# Patient Record
Sex: FEMALE | Race: WHITE | NOT HISPANIC OR LATINO
[De-identification: names, ages, dates, MRNs, and addresses within clinical notes are randomized per-mention and may not be internally consistent; named-entity substitution may affect disease eponyms.]

---

## 2018-08-17 ENCOUNTER — TRANSCRIPTION ENCOUNTER (OUTPATIENT)
Age: 28
End: 2018-08-17

## 2019-12-31 ENCOUNTER — TRANSCRIPTION ENCOUNTER (OUTPATIENT)
Age: 29
End: 2019-12-31

## 2020-01-02 ENCOUNTER — TRANSCRIPTION ENCOUNTER (OUTPATIENT)
Age: 30
End: 2020-01-02

## 2021-04-22 ENCOUNTER — APPOINTMENT (OUTPATIENT)
Dept: ANTEPARTUM | Facility: CLINIC | Age: 31
End: 2021-04-22
Payer: COMMERCIAL

## 2021-04-22 ENCOUNTER — ASOB RESULT (OUTPATIENT)
Age: 31
End: 2021-04-22

## 2021-04-22 PROBLEM — Z00.00 ENCOUNTER FOR PREVENTIVE HEALTH EXAMINATION: Status: ACTIVE | Noted: 2021-04-22

## 2021-04-22 PROCEDURE — 76801 OB US < 14 WKS SINGLE FETUS: CPT

## 2021-04-22 PROCEDURE — 99072 ADDL SUPL MATRL&STAF TM PHE: CPT

## 2021-04-22 PROCEDURE — 76813 OB US NUCHAL MEAS 1 GEST: CPT | Mod: 59

## 2021-06-18 ENCOUNTER — APPOINTMENT (OUTPATIENT)
Dept: ANTEPARTUM | Facility: CLINIC | Age: 31
End: 2021-06-18
Payer: COMMERCIAL

## 2021-06-18 ENCOUNTER — ASOB RESULT (OUTPATIENT)
Age: 31
End: 2021-06-18

## 2021-06-18 PROCEDURE — 76811 OB US DETAILED SNGL FETUS: CPT

## 2021-06-18 PROCEDURE — 99072 ADDL SUPL MATRL&STAF TM PHE: CPT

## 2021-10-08 ENCOUNTER — ASOB RESULT (OUTPATIENT)
Age: 31
End: 2021-10-08

## 2021-10-08 ENCOUNTER — APPOINTMENT (OUTPATIENT)
Dept: ANTEPARTUM | Facility: CLINIC | Age: 31
End: 2021-10-08
Payer: COMMERCIAL

## 2021-10-08 PROCEDURE — 76816 OB US FOLLOW-UP PER FETUS: CPT

## 2021-10-21 ENCOUNTER — TRANSCRIPTION ENCOUNTER (OUTPATIENT)
Age: 31
End: 2021-10-21

## 2021-10-22 ENCOUNTER — INPATIENT (INPATIENT)
Facility: HOSPITAL | Age: 31
LOS: 2 days | Discharge: HOME | End: 2021-10-25
Attending: OBSTETRICS & GYNECOLOGY | Admitting: OBSTETRICS & GYNECOLOGY
Payer: COMMERCIAL

## 2021-10-22 VITALS — OXYGEN SATURATION: 98 %

## 2021-10-22 DIAGNOSIS — Z3A.00 WEEKS OF GESTATION OF PREGNANCY NOT SPECIFIED: ICD-10-CM

## 2021-10-22 DIAGNOSIS — O26.899 OTHER SPECIFIED PREGNANCY RELATED CONDITIONS, UNSPECIFIED TRIMESTER: ICD-10-CM

## 2021-10-22 DIAGNOSIS — Z34.80 ENCOUNTER FOR SUPERVISION OF OTHER NORMAL PREGNANCY, UNSPECIFIED TRIMESTER: ICD-10-CM

## 2021-10-22 LAB
ALBUMIN SERPL ELPH-MCNC: 3.3 G/DL — SIGNIFICANT CHANGE UP (ref 3.3–5)
ALBUMIN SERPL ELPH-MCNC: 3.6 G/DL — SIGNIFICANT CHANGE UP (ref 3.3–5)
ALP SERPL-CCNC: 272 U/L — HIGH (ref 40–120)
ALP SERPL-CCNC: 289 U/L — HIGH (ref 40–120)
ALT FLD-CCNC: 12 U/L — SIGNIFICANT CHANGE UP (ref 10–45)
ALT FLD-CCNC: 13 U/L — SIGNIFICANT CHANGE UP (ref 10–45)
ANION GAP SERPL CALC-SCNC: 14 MMOL/L — SIGNIFICANT CHANGE UP (ref 5–17)
ANION GAP SERPL CALC-SCNC: 16 MMOL/L — SIGNIFICANT CHANGE UP (ref 5–17)
APPEARANCE UR: ABNORMAL
APTT BLD: 30.7 SEC — SIGNIFICANT CHANGE UP (ref 27.5–35.5)
APTT BLD: 32.2 SEC — SIGNIFICANT CHANGE UP (ref 27.5–35.5)
AST SERPL-CCNC: 13 U/L — SIGNIFICANT CHANGE UP (ref 10–40)
AST SERPL-CCNC: 13 U/L — SIGNIFICANT CHANGE UP (ref 10–40)
BACTERIA # UR AUTO: ABNORMAL
BASOPHILS # BLD AUTO: 0.03 K/UL — SIGNIFICANT CHANGE UP (ref 0–0.2)
BASOPHILS # BLD AUTO: 0.05 K/UL — SIGNIFICANT CHANGE UP (ref 0–0.2)
BASOPHILS NFR BLD AUTO: 0.4 % — SIGNIFICANT CHANGE UP (ref 0–2)
BASOPHILS NFR BLD AUTO: 0.7 % — SIGNIFICANT CHANGE UP (ref 0–2)
BILIRUB SERPL-MCNC: 0.2 MG/DL — SIGNIFICANT CHANGE UP (ref 0.2–1.2)
BILIRUB SERPL-MCNC: 0.2 MG/DL — SIGNIFICANT CHANGE UP (ref 0.2–1.2)
BILIRUB UR-MCNC: NEGATIVE — SIGNIFICANT CHANGE UP
BLD GP AB SCN SERPL QL: NEGATIVE — SIGNIFICANT CHANGE UP
BUN SERPL-MCNC: 5 MG/DL — LOW (ref 7–23)
BUN SERPL-MCNC: 6 MG/DL — LOW (ref 7–23)
CALCIUM SERPL-MCNC: 7.7 MG/DL — LOW (ref 8.4–10.5)
CALCIUM SERPL-MCNC: 9.1 MG/DL — SIGNIFICANT CHANGE UP (ref 8.4–10.5)
CHLORIDE SERPL-SCNC: 102 MMOL/L — SIGNIFICANT CHANGE UP (ref 96–108)
CHLORIDE SERPL-SCNC: 99 MMOL/L — SIGNIFICANT CHANGE UP (ref 96–108)
CO2 SERPL-SCNC: 17 MMOL/L — LOW (ref 22–31)
CO2 SERPL-SCNC: 19 MMOL/L — LOW (ref 22–31)
COLOR SPEC: YELLOW — SIGNIFICANT CHANGE UP
COVID-19 SPIKE DOMAIN AB INTERP: NEGATIVE — SIGNIFICANT CHANGE UP
COVID-19 SPIKE DOMAIN ANTIBODY RESULT: 0.4 U/ML — SIGNIFICANT CHANGE UP
CREAT ?TM UR-MCNC: 101 MG/DL — SIGNIFICANT CHANGE UP
CREAT SERPL-MCNC: 0.6 MG/DL — SIGNIFICANT CHANGE UP (ref 0.5–1.3)
CREAT SERPL-MCNC: 0.66 MG/DL — SIGNIFICANT CHANGE UP (ref 0.5–1.3)
DIFF PNL FLD: NEGATIVE — SIGNIFICANT CHANGE UP
EOSINOPHIL # BLD AUTO: 0.03 K/UL — SIGNIFICANT CHANGE UP (ref 0–0.5)
EOSINOPHIL # BLD AUTO: 0.13 K/UL — SIGNIFICANT CHANGE UP (ref 0–0.5)
EOSINOPHIL NFR BLD AUTO: 0.4 % — SIGNIFICANT CHANGE UP (ref 0–6)
EOSINOPHIL NFR BLD AUTO: 1.8 % — SIGNIFICANT CHANGE UP (ref 0–6)
EPI CELLS # UR: 4 /HPF — SIGNIFICANT CHANGE UP
FIBRINOGEN PPP-MCNC: 830 MG/DL — HIGH (ref 290–520)
FIBRINOGEN PPP-MCNC: 846 MG/DL — HIGH (ref 290–520)
GLUCOSE SERPL-MCNC: 66 MG/DL — LOW (ref 70–99)
GLUCOSE SERPL-MCNC: 84 MG/DL — SIGNIFICANT CHANGE UP (ref 70–99)
GLUCOSE UR QL: NEGATIVE — SIGNIFICANT CHANGE UP
HCT VFR BLD CALC: 34 % — LOW (ref 34.5–45)
HCT VFR BLD CALC: 36.2 % — SIGNIFICANT CHANGE UP (ref 34.5–45)
HGB BLD-MCNC: 11.2 G/DL — LOW (ref 11.5–15.5)
HGB BLD-MCNC: 11.7 G/DL — SIGNIFICANT CHANGE UP (ref 11.5–15.5)
HYALINE CASTS # UR AUTO: 4 /LPF — HIGH (ref 0–2)
IMM GRANULOCYTES NFR BLD AUTO: 0.7 % — SIGNIFICANT CHANGE UP (ref 0–1.5)
IMM GRANULOCYTES NFR BLD AUTO: 0.9 % — SIGNIFICANT CHANGE UP (ref 0–1.5)
INR BLD: 0.9 RATIO — SIGNIFICANT CHANGE UP (ref 0.88–1.16)
INR BLD: 0.93 RATIO — SIGNIFICANT CHANGE UP (ref 0.88–1.16)
KETONES UR-MCNC: NEGATIVE — SIGNIFICANT CHANGE UP
LDH SERPL L TO P-CCNC: 203 U/L — SIGNIFICANT CHANGE UP (ref 50–242)
LDH SERPL L TO P-CCNC: 227 U/L — SIGNIFICANT CHANGE UP (ref 50–242)
LEUKOCYTE ESTERASE UR-ACNC: ABNORMAL
LYMPHOCYTES # BLD AUTO: 1.79 K/UL — SIGNIFICANT CHANGE UP (ref 1–3.3)
LYMPHOCYTES # BLD AUTO: 2.17 K/UL — SIGNIFICANT CHANGE UP (ref 1–3.3)
LYMPHOCYTES # BLD AUTO: 23.4 % — SIGNIFICANT CHANGE UP (ref 13–44)
LYMPHOCYTES # BLD AUTO: 30.8 % — SIGNIFICANT CHANGE UP (ref 13–44)
MAGNESIUM SERPL-MCNC: 5.1 MG/DL — HIGH (ref 1.6–2.6)
MAGNESIUM SERPL-MCNC: 5.2 MG/DL — HIGH (ref 1.6–2.6)
MCHC RBC-ENTMCNC: 26.4 PG — LOW (ref 27–34)
MCHC RBC-ENTMCNC: 26.6 PG — LOW (ref 27–34)
MCHC RBC-ENTMCNC: 32.3 GM/DL — SIGNIFICANT CHANGE UP (ref 32–36)
MCHC RBC-ENTMCNC: 32.9 GM/DL — SIGNIFICANT CHANGE UP (ref 32–36)
MCV RBC AUTO: 80 FL — SIGNIFICANT CHANGE UP (ref 80–100)
MCV RBC AUTO: 82.3 FL — SIGNIFICANT CHANGE UP (ref 80–100)
MONOCYTES # BLD AUTO: 0.49 K/UL — SIGNIFICANT CHANGE UP (ref 0–0.9)
MONOCYTES # BLD AUTO: 0.62 K/UL — SIGNIFICANT CHANGE UP (ref 0–0.9)
MONOCYTES NFR BLD AUTO: 6.4 % — SIGNIFICANT CHANGE UP (ref 2–14)
MONOCYTES NFR BLD AUTO: 8.8 % — SIGNIFICANT CHANGE UP (ref 2–14)
NEUTROPHILS # BLD AUTO: 4.02 K/UL — SIGNIFICANT CHANGE UP (ref 1.8–7.4)
NEUTROPHILS # BLD AUTO: 5.24 K/UL — SIGNIFICANT CHANGE UP (ref 1.8–7.4)
NEUTROPHILS NFR BLD AUTO: 57.2 % — SIGNIFICANT CHANGE UP (ref 43–77)
NEUTROPHILS NFR BLD AUTO: 68.5 % — SIGNIFICANT CHANGE UP (ref 43–77)
NITRITE UR-MCNC: NEGATIVE — SIGNIFICANT CHANGE UP
NRBC # BLD: 0 /100 WBCS — SIGNIFICANT CHANGE UP (ref 0–0)
NRBC # BLD: 0 /100 WBCS — SIGNIFICANT CHANGE UP (ref 0–0)
PH UR: 7 — SIGNIFICANT CHANGE UP (ref 5–8)
PLATELET # BLD AUTO: 172 K/UL — SIGNIFICANT CHANGE UP (ref 150–400)
PLATELET # BLD AUTO: 193 K/UL — SIGNIFICANT CHANGE UP (ref 150–400)
POTASSIUM SERPL-MCNC: 3.6 MMOL/L — SIGNIFICANT CHANGE UP (ref 3.5–5.3)
POTASSIUM SERPL-MCNC: 4 MMOL/L — SIGNIFICANT CHANGE UP (ref 3.5–5.3)
POTASSIUM SERPL-SCNC: 3.6 MMOL/L — SIGNIFICANT CHANGE UP (ref 3.5–5.3)
POTASSIUM SERPL-SCNC: 4 MMOL/L — SIGNIFICANT CHANGE UP (ref 3.5–5.3)
PROT ?TM UR-MCNC: 39 MG/DL — HIGH (ref 0–12)
PROT SERPL-MCNC: 6 G/DL — SIGNIFICANT CHANGE UP (ref 6–8.3)
PROT SERPL-MCNC: 6.5 G/DL — SIGNIFICANT CHANGE UP (ref 6–8.3)
PROT UR-MCNC: ABNORMAL
PROT/CREAT UR-RTO: 0.4 RATIO — HIGH (ref 0–0.2)
PROTHROM AB SERPL-ACNC: 10.8 SEC — SIGNIFICANT CHANGE UP (ref 10.6–13.6)
PROTHROM AB SERPL-ACNC: 11.2 SEC — SIGNIFICANT CHANGE UP (ref 10.6–13.6)
RBC # BLD: 4.25 M/UL — SIGNIFICANT CHANGE UP (ref 3.8–5.2)
RBC # BLD: 4.4 M/UL — SIGNIFICANT CHANGE UP (ref 3.8–5.2)
RBC # FLD: 12.3 % — SIGNIFICANT CHANGE UP (ref 10.3–14.5)
RBC # FLD: 12.3 % — SIGNIFICANT CHANGE UP (ref 10.3–14.5)
RBC CASTS # UR COMP ASSIST: SIGNIFICANT CHANGE UP /HPF (ref 0–4)
RH IG SCN BLD-IMP: POSITIVE — SIGNIFICANT CHANGE UP
SARS-COV-2 IGG+IGM SERPL QL IA: 0.4 U/ML — SIGNIFICANT CHANGE UP
SARS-COV-2 IGG+IGM SERPL QL IA: NEGATIVE — SIGNIFICANT CHANGE UP
SARS-COV-2 RNA SPEC QL NAA+PROBE: SIGNIFICANT CHANGE UP
SODIUM SERPL-SCNC: 132 MMOL/L — LOW (ref 135–145)
SODIUM SERPL-SCNC: 135 MMOL/L — SIGNIFICANT CHANGE UP (ref 135–145)
SP GR SPEC: 1.02 — SIGNIFICANT CHANGE UP (ref 1.01–1.02)
T PALLIDUM AB TITR SER: NEGATIVE — SIGNIFICANT CHANGE UP
URATE SERPL-MCNC: 4.9 MG/DL — SIGNIFICANT CHANGE UP (ref 2.5–7)
URATE SERPL-MCNC: 5 MG/DL — SIGNIFICANT CHANGE UP (ref 2.5–7)
UROBILINOGEN FLD QL: NEGATIVE — SIGNIFICANT CHANGE UP
WBC # BLD: 7.04 K/UL — SIGNIFICANT CHANGE UP (ref 3.8–10.5)
WBC # BLD: 7.65 K/UL — SIGNIFICANT CHANGE UP (ref 3.8–10.5)
WBC # FLD AUTO: 7.04 K/UL — SIGNIFICANT CHANGE UP (ref 3.8–10.5)
WBC # FLD AUTO: 7.65 K/UL — SIGNIFICANT CHANGE UP (ref 3.8–10.5)
WBC UR QL: 29 /HPF — HIGH (ref 0–5)

## 2021-10-22 PROCEDURE — 88307 TISSUE EXAM BY PATHOLOGIST: CPT | Mod: 26

## 2021-10-22 RX ORDER — ACETAMINOPHEN 500 MG
975 TABLET ORAL
Refills: 0 | Status: DISCONTINUED | OUTPATIENT
Start: 2021-10-22 | End: 2021-10-25

## 2021-10-22 RX ORDER — DIPHENHYDRAMINE HCL 50 MG
25 CAPSULE ORAL ONCE
Refills: 0 | Status: COMPLETED | OUTPATIENT
Start: 2021-10-22 | End: 2021-10-22

## 2021-10-22 RX ORDER — OXYTOCIN 10 UNIT/ML
333.33 VIAL (ML) INJECTION
Qty: 20 | Refills: 0 | Status: DISCONTINUED | OUTPATIENT
Start: 2021-10-22 | End: 2021-10-25

## 2021-10-22 RX ORDER — SIMETHICONE 80 MG/1
80 TABLET, CHEWABLE ORAL EVERY 4 HOURS
Refills: 0 | Status: DISCONTINUED | OUTPATIENT
Start: 2021-10-22 | End: 2021-10-25

## 2021-10-22 RX ORDER — VANCOMYCIN HCL 1 G
VIAL (EA) INTRAVENOUS
Refills: 0 | Status: DISCONTINUED | OUTPATIENT
Start: 2021-10-22 | End: 2021-10-23

## 2021-10-22 RX ORDER — HEPARIN SODIUM 5000 [USP'U]/ML
5000 INJECTION INTRAVENOUS; SUBCUTANEOUS EVERY 12 HOURS
Refills: 0 | Status: DISCONTINUED | OUTPATIENT
Start: 2021-10-22 | End: 2021-10-25

## 2021-10-22 RX ORDER — IBUPROFEN 200 MG
600 TABLET ORAL EVERY 6 HOURS
Refills: 0 | Status: COMPLETED | OUTPATIENT
Start: 2021-10-22 | End: 2022-09-20

## 2021-10-22 RX ORDER — OXYCODONE HYDROCHLORIDE 5 MG/1
10 TABLET ORAL
Refills: 0 | Status: DISCONTINUED | OUTPATIENT
Start: 2021-10-22 | End: 2021-10-23

## 2021-10-22 RX ORDER — NIFEDIPINE 30 MG
20 TABLET, EXTENDED RELEASE 24 HR ORAL ONCE
Refills: 0 | Status: COMPLETED | OUTPATIENT
Start: 2021-10-22 | End: 2021-10-22

## 2021-10-22 RX ORDER — MAGNESIUM SULFATE 500 MG/ML
4 VIAL (ML) INJECTION ONCE
Refills: 0 | Status: COMPLETED | OUTPATIENT
Start: 2021-10-22 | End: 2021-10-22

## 2021-10-22 RX ORDER — INFLUENZA VIRUS VACCINE 15; 15; 15; 15 UG/.5ML; UG/.5ML; UG/.5ML; UG/.5ML
0.5 SUSPENSION INTRAMUSCULAR ONCE
Refills: 0 | Status: DISCONTINUED | OUTPATIENT
Start: 2021-10-22 | End: 2021-10-25

## 2021-10-22 RX ORDER — NALOXONE HYDROCHLORIDE 4 MG/.1ML
0.1 SPRAY NASAL
Refills: 0 | Status: DISCONTINUED | OUTPATIENT
Start: 2021-10-22 | End: 2021-10-23

## 2021-10-22 RX ORDER — DIPHENHYDRAMINE HCL 50 MG
25 CAPSULE ORAL EVERY 6 HOURS
Refills: 0 | Status: DISCONTINUED | OUTPATIENT
Start: 2021-10-22 | End: 2021-10-25

## 2021-10-22 RX ORDER — TETANUS TOXOID, REDUCED DIPHTHERIA TOXOID AND ACELLULAR PERTUSSIS VACCINE, ADSORBED 5; 2.5; 8; 8; 2.5 [IU]/.5ML; [IU]/.5ML; UG/.5ML; UG/.5ML; UG/.5ML
0.5 SUSPENSION INTRAMUSCULAR ONCE
Refills: 0 | Status: DISCONTINUED | OUTPATIENT
Start: 2021-10-22 | End: 2021-10-25

## 2021-10-22 RX ORDER — SODIUM CHLORIDE 9 MG/ML
1000 INJECTION, SOLUTION INTRAVENOUS
Refills: 0 | Status: DISCONTINUED | OUTPATIENT
Start: 2021-10-22 | End: 2021-10-25

## 2021-10-22 RX ORDER — DEXAMETHASONE 0.5 MG/5ML
4 ELIXIR ORAL EVERY 6 HOURS
Refills: 0 | Status: DISCONTINUED | OUTPATIENT
Start: 2021-10-22 | End: 2021-10-23

## 2021-10-22 RX ORDER — MAGNESIUM SULFATE 500 MG/ML
2 VIAL (ML) INJECTION
Qty: 40 | Refills: 0 | Status: DISCONTINUED | OUTPATIENT
Start: 2021-10-22 | End: 2021-10-23

## 2021-10-22 RX ORDER — SODIUM CHLORIDE 9 MG/ML
1000 INJECTION, SOLUTION INTRAVENOUS
Refills: 0 | Status: DISCONTINUED | OUTPATIENT
Start: 2021-10-22 | End: 2021-10-23

## 2021-10-22 RX ORDER — MAGNESIUM SULFATE 500 MG/ML
4 VIAL (ML) INJECTION ONCE
Refills: 0 | Status: DISCONTINUED | OUTPATIENT
Start: 2021-10-22 | End: 2021-10-22

## 2021-10-22 RX ORDER — VANCOMYCIN HCL 1 G
1000 VIAL (EA) INTRAVENOUS ONCE
Refills: 0 | Status: COMPLETED | OUTPATIENT
Start: 2021-10-22 | End: 2021-10-22

## 2021-10-22 RX ORDER — LANOLIN
1 OINTMENT (GRAM) TOPICAL EVERY 6 HOURS
Refills: 0 | Status: DISCONTINUED | OUTPATIENT
Start: 2021-10-22 | End: 2021-10-25

## 2021-10-22 RX ORDER — ONDANSETRON 8 MG/1
4 TABLET, FILM COATED ORAL EVERY 6 HOURS
Refills: 0 | Status: DISCONTINUED | OUTPATIENT
Start: 2021-10-22 | End: 2021-10-23

## 2021-10-22 RX ORDER — OXYCODONE HYDROCHLORIDE 5 MG/1
5 TABLET ORAL
Refills: 0 | Status: DISCONTINUED | OUTPATIENT
Start: 2021-10-22 | End: 2021-10-25

## 2021-10-22 RX ORDER — MAGNESIUM HYDROXIDE 400 MG/1
30 TABLET, CHEWABLE ORAL
Refills: 0 | Status: DISCONTINUED | OUTPATIENT
Start: 2021-10-22 | End: 2021-10-25

## 2021-10-22 RX ORDER — LABETALOL HCL 100 MG
200 TABLET ORAL
Refills: 0 | Status: DISCONTINUED | OUTPATIENT
Start: 2021-10-22 | End: 2021-10-23

## 2021-10-22 RX ORDER — CITRIC ACID/SODIUM CITRATE 300-500 MG
15 SOLUTION, ORAL ORAL EVERY 6 HOURS
Refills: 0 | Status: DISCONTINUED | OUTPATIENT
Start: 2021-10-22 | End: 2021-10-23

## 2021-10-22 RX ORDER — NIFEDIPINE 30 MG
10 TABLET, EXTENDED RELEASE 24 HR ORAL ONCE
Refills: 0 | Status: COMPLETED | OUTPATIENT
Start: 2021-10-22 | End: 2021-10-22

## 2021-10-22 RX ORDER — METOCLOPRAMIDE HCL 10 MG
10 TABLET ORAL ONCE
Refills: 0 | Status: COMPLETED | OUTPATIENT
Start: 2021-10-22 | End: 2021-10-22

## 2021-10-22 RX ORDER — VANCOMYCIN HCL 1 G
1000 VIAL (EA) INTRAVENOUS EVERY 12 HOURS
Refills: 0 | Status: DISCONTINUED | OUTPATIENT
Start: 2021-10-23 | End: 2021-10-23

## 2021-10-22 RX ORDER — MAGNESIUM SULFATE 500 MG/ML
2 VIAL (ML) INJECTION
Qty: 40 | Refills: 0 | Status: DISCONTINUED | OUTPATIENT
Start: 2021-10-22 | End: 2021-10-22

## 2021-10-22 RX ORDER — MORPHINE SULFATE 50 MG/1
0.1 CAPSULE, EXTENDED RELEASE ORAL ONCE
Refills: 0 | Status: DISCONTINUED | OUTPATIENT
Start: 2021-10-22 | End: 2021-10-23

## 2021-10-22 RX ORDER — DIPHENHYDRAMINE HCL 50 MG
25 CAPSULE ORAL ONCE
Refills: 0 | Status: DISCONTINUED | OUTPATIENT
Start: 2021-10-22 | End: 2021-10-22

## 2021-10-22 RX ORDER — OXYCODONE HYDROCHLORIDE 5 MG/1
5 TABLET ORAL
Refills: 0 | Status: DISCONTINUED | OUTPATIENT
Start: 2021-10-22 | End: 2021-10-23

## 2021-10-22 RX ORDER — OXYCODONE HYDROCHLORIDE 5 MG/1
5 TABLET ORAL ONCE
Refills: 0 | Status: DISCONTINUED | OUTPATIENT
Start: 2021-10-22 | End: 2021-10-25

## 2021-10-22 RX ADMIN — Medication 50 GM/HR: at 12:26

## 2021-10-22 RX ADMIN — Medication 200 MILLIGRAM(S): at 18:26

## 2021-10-22 RX ADMIN — Medication 10 MILLIGRAM(S): at 11:38

## 2021-10-22 RX ADMIN — Medication 10 MILLIGRAM(S): at 13:46

## 2021-10-22 RX ADMIN — Medication 250 MILLIGRAM(S): at 13:25

## 2021-10-22 RX ADMIN — Medication 25 MILLIGRAM(S): at 15:45

## 2021-10-22 RX ADMIN — Medication 300 GRAM(S): at 11:49

## 2021-10-22 RX ADMIN — Medication 20 MILLIGRAM(S): at 15:36

## 2021-10-22 NOTE — OB PROVIDER H&P - ASSESSMENT
-2
30 y/o  @ 38w1d presents with elevated BP concerning for sPEC    - Admit to L&D   - Concerning for sPEC: severe BPs 171/105 repeat 162/117, 's. s/p Nifedipine IR 10 mg x 1 dose, repeated /93. Start Magnesium for seizure ppx. sent HELLP labs. Reglan/Benadryl prn for HA  - CLD/IVF  - Admission labs  - [facility]/[place]  - Continue monitor vitals  - IOL for sPEC    Plan of care d/w Dr. Noreen Stinson PA-C

## 2021-10-22 NOTE — OB RN TRIAGE NOTE - NS_LABORDETAILS_OBGYN_ALL_OB
Dx: Acute bilateral low back pain without sciatica (M54.5), Degenerative disc disease, lumbar (M51.36), Spinal stenosis of lumbar region without neurogenic claudication (O16.014)  Authorized # of Visits: 2/8  Next MD visit: none scheduled  Fall Risk: stand
None

## 2021-10-22 NOTE — OB RN INTRAOPERATIVE NOTE - NSSELHIDDEN_OBGYN_ALL_OB_FT
[NS_DeliveryAttending1_OBGYN_ALL_OB_FT:[id number]==] [NS_DeliveryAttending1_OBGYN_ALL_OB_FT:[id number]==],[NS_DeliveryRN_OBGYN_ALL_OB_FT:[id number]=]

## 2021-10-22 NOTE — OB PROVIDER LABOR PROGRESS NOTE - NS_OBIHIFHRDETAILS_OBGYN_ALL_OB_FT
125, reactive cat 1 tracing
125, reactive cat 1 tracing
Cat I: 130/ mod variability/ + accels/ - decels

## 2021-10-22 NOTE — OB PROVIDER DELIVERY SUMMARY - NSPROVIDERDELIVERYNOTE_OBGYN_ALL_OB_FT
primary LTCS, uncomplicated for worsening severe preeclampsia  viable female infant, vertex presentation, Apgars 9/9 cord gasses sent  Grossly normal fallopian tubes, uterus, and ovaries    Laura placed over the hysterotomy     EBL: 800  IVF: 2000  UOP:800    Danielle PGY2  w/ Dr. Sorto

## 2021-10-22 NOTE — OB PROVIDER H&P - HISTORY OF PRESENT ILLNESS
30 y/o  @ 38w1d EDC 21 was sent from office for elevated /100 today. c/o frontal HA for 2 days, not relieved with Tylenol. c/o n/v intermittently in 3rd trimester. reports FM, denies ctx, LOF or VB. denies vision change or RUQ pain.     PGYNHx: Hx of +HPV s/p colposcopy  PMHx: none  PSHx: none  ALL: Penicillin (childhood, hives)  SH: denies t/e/d, denies hx of anxiety or depression

## 2021-10-22 NOTE — OB PROVIDER H&P - NSHPPHYSICALEXAM_GEN_ALL_CORE
Gen: NAD, A&O x 4  Pulm: non-labor breathing   Heart: tachycardic   Abd: soft, Gravid, NT  Ext: skin warm to touch  Pelvis: 1/50/-3    BSUS: Vertex

## 2021-10-22 NOTE — OB PROVIDER LABOR PROGRESS NOTE - ASSESSMENT
32 yo  @38+1 sPEC s/p procardia 10 and 20 push.    - SROM @5:10pm  - T Cat I  - Expect JORDY Chairez, PGY1
Given worsening headache refractory to tx  will need to proceed to  delivery for severe PEC with severe features  will consider head imaging if headaches do not improve  L+D and anesthesia team notified  Above d/w pt and her  and all questions were answered  Will proceed to  delivery     UMBERTO Grewal
Reviewed pt's bps;    Few severe range bps noted.  Additional 20mg PO nifefipine was given to pt and bps improved.  Pt also started on labetalol 200mg BID as maintenance  Will continue Mag sulfate for seizure prophylaxis.  Continue oral cytotec  Expectant mgmt    UMBERTO Grewal

## 2021-10-22 NOTE — OB NEONATOLOGY/PEDIATRICIAN DELIVERY SUMMARY - NSPEDSNEONOTESA_OBGYN_ALL_OB_FT
Baby girl born at 38.1 wks via CS 2/2 worsening maternal pre-eclampsia to a 30y/o  O+blood type mother. Maternal history of migraines and HPV (resolved) Prenatal history of pre-eclampsia on mag.  PNL nr/immune/-, GBS + 10/07 ( 1x rasmussen vanc). SROM at 17:13 with clear fluids. Baby emerged vigorous, crying, was w/d/s/s with APGARS of 9/9. Mom would like to breast feed, consents Hep B and EOS 0.04    BW: 2803 g  : 10/22  TOB: 20:24  ADOD: 10/24

## 2021-10-22 NOTE — OB PROVIDER LABOR PROGRESS NOTE - NS_SUBJECTIVE/OBJECTIVE_OBGYN_ALL_OB_FT
Pt c/o worsening headache, now rating it 10/10 with nausea
Pt c/o headache.  She has h/o migraines and feels it is like a typical migraine  Denies any visual changes or epigastric discomfort
Called by RN because patient SROMed @5:10 pm. Patient is 1.5/0/-3

## 2021-10-22 NOTE — OB PROVIDER H&P - ATTENDING COMMENTS
I personally saw and evaluated pt and agree with IDALIA Stinson's assessment and plan.  Pt was sent to NS L+D from office with elevated bp of 150/103.  She reports a 3 day h/o headaches but she has h/o migraines and felt it was from her migraines  She denies any visual changes or epigastric pain  On arrival to L+D  pt had 2 severe range bps within 15 min span.  She was given dose of PO nifedipine 10mg and bp responded well.  Pt was also started on Mag sulfate for seizure prophylaxis  Discussed with pt and her  regarding IOL at term for sPEC.   All questions answered.  Will start IOL with oral cytotec    UMBERTO Grewal

## 2021-10-22 NOTE — OB RN DELIVERY SUMMARY - NS_SEPSISRSKCALC_OBGYN_ALL_OB_FT
EOS calculated successfully. EOS Risk Factor: 0.5/1000 live births ([facility] national incidence); GA=38w1d; Temp=98.24; ROM=3.183; GBS='Positive'; Antibiotics='Broad spectrum antibiotics > 4 hrs prior to birth'

## 2021-10-23 LAB
MAGNESIUM SERPL-MCNC: 5.4 MG/DL — HIGH (ref 1.6–2.6)
MAGNESIUM SERPL-MCNC: 5.9 MG/DL — HIGH (ref 1.6–2.6)
MAGNESIUM SERPL-MCNC: 6.4 MG/DL — HIGH (ref 1.6–2.6)
MAGNESIUM SERPL-MCNC: 6.4 MG/DL — HIGH (ref 1.6–2.6)

## 2021-10-23 RX ORDER — KETOROLAC TROMETHAMINE 30 MG/ML
30 SYRINGE (ML) INJECTION EVERY 6 HOURS
Refills: 0 | Status: DISCONTINUED | OUTPATIENT
Start: 2021-10-23 | End: 2021-10-24

## 2021-10-23 RX ORDER — ONDANSETRON 8 MG/1
4 TABLET, FILM COATED ORAL ONCE
Refills: 0 | Status: DISCONTINUED | OUTPATIENT
Start: 2021-10-23 | End: 2021-10-25

## 2021-10-23 RX ORDER — MAGNESIUM SULFATE 500 MG/ML
2 VIAL (ML) INJECTION
Qty: 40 | Refills: 0 | Status: DISCONTINUED | OUTPATIENT
Start: 2021-10-23 | End: 2021-10-25

## 2021-10-23 RX ORDER — BENZOCAINE AND MENTHOL 5; 1 G/100ML; G/100ML
1 LIQUID ORAL ONCE
Refills: 0 | Status: COMPLETED | OUTPATIENT
Start: 2021-10-23 | End: 2021-10-23

## 2021-10-23 RX ORDER — LABETALOL HCL 100 MG
200 TABLET ORAL THREE TIMES A DAY
Refills: 0 | Status: DISCONTINUED | OUTPATIENT
Start: 2021-10-23 | End: 2021-10-25

## 2021-10-23 RX ORDER — ACETAMINOPHEN 500 MG
1000 TABLET ORAL ONCE
Refills: 0 | Status: COMPLETED | OUTPATIENT
Start: 2021-10-23 | End: 2021-10-23

## 2021-10-23 RX ORDER — MAGNESIUM SULFATE 500 MG/ML
4 VIAL (ML) INJECTION ONCE
Refills: 0 | Status: DISCONTINUED | OUTPATIENT
Start: 2021-10-23 | End: 2021-10-25

## 2021-10-23 RX ADMIN — Medication 30 MILLIGRAM(S): at 07:06

## 2021-10-23 RX ADMIN — Medication 30 MILLIGRAM(S): at 11:45

## 2021-10-23 RX ADMIN — Medication 1000 MILLIGRAM(S): at 04:09

## 2021-10-23 RX ADMIN — Medication 50 GM/HR: at 06:35

## 2021-10-23 RX ADMIN — Medication 30 MILLIGRAM(S): at 11:15

## 2021-10-23 RX ADMIN — Medication 4 MILLIGRAM(S): at 00:32

## 2021-10-23 RX ADMIN — Medication 975 MILLIGRAM(S): at 20:19

## 2021-10-23 RX ADMIN — Medication 400 MILLIGRAM(S): at 03:39

## 2021-10-23 RX ADMIN — Medication 975 MILLIGRAM(S): at 09:45

## 2021-10-23 RX ADMIN — Medication 975 MILLIGRAM(S): at 09:10

## 2021-10-23 RX ADMIN — BENZOCAINE AND MENTHOL 1 LOZENGE: 5; 1 LIQUID ORAL at 14:05

## 2021-10-23 RX ADMIN — Medication 200 MILLIGRAM(S): at 06:36

## 2021-10-23 RX ADMIN — ONDANSETRON 4 MILLIGRAM(S): 8 TABLET, FILM COATED ORAL at 09:25

## 2021-10-23 RX ADMIN — ONDANSETRON 4 MILLIGRAM(S): 8 TABLET, FILM COATED ORAL at 00:32

## 2021-10-23 RX ADMIN — HEPARIN SODIUM 5000 UNIT(S): 5000 INJECTION INTRAVENOUS; SUBCUTANEOUS at 06:36

## 2021-10-23 RX ADMIN — Medication 200 MILLIGRAM(S): at 22:38

## 2021-10-23 RX ADMIN — Medication 30 MILLIGRAM(S): at 06:36

## 2021-10-23 RX ADMIN — Medication 975 MILLIGRAM(S): at 19:49

## 2021-10-23 RX ADMIN — Medication 30 MILLIGRAM(S): at 17:37

## 2021-10-23 RX ADMIN — HEPARIN SODIUM 5000 UNIT(S): 5000 INJECTION INTRAVENOUS; SUBCUTANEOUS at 17:37

## 2021-10-23 NOTE — PROGRESS NOTE ADULT - ASSESSMENT
A/P: 30yo POD#1 s/p pLTCS 2/2 sPEC and unremitting HA. Headache resolved s/p delivery of infant. Patient is stable and doing well post-operatively.      #sPEC  - c/w Mg for seizure PPx (10/22-)  - continue Labetalol 200BID   - monitor BPs  - HELLP labs wnl    #Postpartum  - HSQ and SCDs for DVT PPx  - Regular diet  - OOB, encourage ambulation  - Continue motrin, tylenol, oxycodone PRN for pain control.      Devorah Vu, PGY-3

## 2021-10-24 RX ORDER — IBUPROFEN 200 MG
600 TABLET ORAL EVERY 6 HOURS
Refills: 0 | Status: DISCONTINUED | OUTPATIENT
Start: 2021-10-24 | End: 2021-10-25

## 2021-10-24 RX ADMIN — HEPARIN SODIUM 5000 UNIT(S): 5000 INJECTION INTRAVENOUS; SUBCUTANEOUS at 05:42

## 2021-10-24 RX ADMIN — Medication 975 MILLIGRAM(S): at 22:00

## 2021-10-24 RX ADMIN — Medication 975 MILLIGRAM(S): at 08:07

## 2021-10-24 RX ADMIN — Medication 975 MILLIGRAM(S): at 14:34

## 2021-10-24 RX ADMIN — Medication 600 MILLIGRAM(S): at 05:41

## 2021-10-24 RX ADMIN — Medication 975 MILLIGRAM(S): at 02:41

## 2021-10-24 RX ADMIN — Medication 200 MILLIGRAM(S): at 13:34

## 2021-10-24 RX ADMIN — Medication 200 MILLIGRAM(S): at 05:42

## 2021-10-24 RX ADMIN — Medication 600 MILLIGRAM(S): at 17:38

## 2021-10-24 RX ADMIN — Medication 975 MILLIGRAM(S): at 08:37

## 2021-10-24 RX ADMIN — Medication 600 MILLIGRAM(S): at 11:38

## 2021-10-24 RX ADMIN — HEPARIN SODIUM 5000 UNIT(S): 5000 INJECTION INTRAVENOUS; SUBCUTANEOUS at 17:08

## 2021-10-24 RX ADMIN — Medication 600 MILLIGRAM(S): at 12:08

## 2021-10-24 RX ADMIN — Medication 30 MILLIGRAM(S): at 00:30

## 2021-10-24 RX ADMIN — Medication 975 MILLIGRAM(S): at 21:13

## 2021-10-24 RX ADMIN — Medication 600 MILLIGRAM(S): at 17:08

## 2021-10-24 RX ADMIN — Medication 975 MILLIGRAM(S): at 15:04

## 2021-10-24 RX ADMIN — Medication 975 MILLIGRAM(S): at 03:11

## 2021-10-24 RX ADMIN — Medication 30 MILLIGRAM(S): at 00:00

## 2021-10-24 RX ADMIN — Medication 200 MILLIGRAM(S): at 21:13

## 2021-10-24 NOTE — PROGRESS NOTE ADULT - ASSESSMENT
A/P: 32yo POD#2 s/p pLTCS 2/2 sPEC and unremitting HA. Headache resolved s/p delivery of infant. Patient is stable and doing well post-operatively.      #sPEC  - s/p Mg for seizure PPx (10/22-10/23)  - continue Labetalol 200BID   - monitor BPs  - HELLP labs wnl    #Postpartum  - HSQ and SCDs for DVT PPx  - Regular diet  - OOB, encourage ambulation  - Continue motrin, tylenol, oxycodone PRN for pain control.      Sb Reionso PGY3

## 2021-10-25 ENCOUNTER — TRANSCRIPTION ENCOUNTER (OUTPATIENT)
Age: 31
End: 2021-10-25

## 2021-10-25 VITALS
DIASTOLIC BLOOD PRESSURE: 82 MMHG | SYSTOLIC BLOOD PRESSURE: 124 MMHG | RESPIRATION RATE: 18 BRPM | OXYGEN SATURATION: 97 % | TEMPERATURE: 98 F | HEART RATE: 89 BPM

## 2021-10-25 PROCEDURE — G0463: CPT

## 2021-10-25 PROCEDURE — 36415 COLL VENOUS BLD VENIPUNCTURE: CPT

## 2021-10-25 PROCEDURE — 86901 BLOOD TYPING SEROLOGIC RH(D): CPT

## 2021-10-25 PROCEDURE — 84156 ASSAY OF PROTEIN URINE: CPT

## 2021-10-25 PROCEDURE — 88307 TISSUE EXAM BY PATHOLOGIST: CPT

## 2021-10-25 PROCEDURE — 86769 SARS-COV-2 COVID-19 ANTIBODY: CPT

## 2021-10-25 PROCEDURE — 84550 ASSAY OF BLOOD/URIC ACID: CPT

## 2021-10-25 PROCEDURE — 59050 FETAL MONITOR W/REPORT: CPT

## 2021-10-25 PROCEDURE — 85384 FIBRINOGEN ACTIVITY: CPT

## 2021-10-25 PROCEDURE — 85025 COMPLETE CBC W/AUTO DIFF WBC: CPT

## 2021-10-25 PROCEDURE — 81001 URINALYSIS AUTO W/SCOPE: CPT

## 2021-10-25 PROCEDURE — 83735 ASSAY OF MAGNESIUM: CPT

## 2021-10-25 PROCEDURE — 86850 RBC ANTIBODY SCREEN: CPT

## 2021-10-25 PROCEDURE — 59025 FETAL NON-STRESS TEST: CPT

## 2021-10-25 PROCEDURE — 83615 LACTATE (LD) (LDH) ENZYME: CPT

## 2021-10-25 PROCEDURE — 85610 PROTHROMBIN TIME: CPT

## 2021-10-25 PROCEDURE — C1889: CPT

## 2021-10-25 PROCEDURE — U0005: CPT

## 2021-10-25 PROCEDURE — 86780 TREPONEMA PALLIDUM: CPT

## 2021-10-25 PROCEDURE — 86900 BLOOD TYPING SEROLOGIC ABO: CPT

## 2021-10-25 PROCEDURE — 85730 THROMBOPLASTIN TIME PARTIAL: CPT

## 2021-10-25 PROCEDURE — 80053 COMPREHEN METABOLIC PANEL: CPT

## 2021-10-25 PROCEDURE — U0003: CPT

## 2021-10-25 PROCEDURE — 82570 ASSAY OF URINE CREATININE: CPT

## 2021-10-25 RX ORDER — LANOLIN
1 OINTMENT (GRAM) TOPICAL
Qty: 0 | Refills: 0 | DISCHARGE
Start: 2021-10-25

## 2021-10-25 RX ORDER — LABETALOL HCL 100 MG
1 TABLET ORAL
Qty: 0 | Refills: 0 | DISCHARGE
Start: 2021-10-25

## 2021-10-25 RX ORDER — SIMETHICONE 80 MG/1
1 TABLET, CHEWABLE ORAL
Qty: 0 | Refills: 0 | DISCHARGE
Start: 2021-10-25

## 2021-10-25 RX ADMIN — Medication 600 MILLIGRAM(S): at 00:53

## 2021-10-25 RX ADMIN — Medication 600 MILLIGRAM(S): at 11:57

## 2021-10-25 RX ADMIN — Medication 975 MILLIGRAM(S): at 09:27

## 2021-10-25 RX ADMIN — Medication 200 MILLIGRAM(S): at 13:06

## 2021-10-25 RX ADMIN — Medication 600 MILLIGRAM(S): at 05:53

## 2021-10-25 RX ADMIN — Medication 600 MILLIGRAM(S): at 01:30

## 2021-10-25 RX ADMIN — HEPARIN SODIUM 5000 UNIT(S): 5000 INJECTION INTRAVENOUS; SUBCUTANEOUS at 05:52

## 2021-10-25 RX ADMIN — Medication 200 MILLIGRAM(S): at 05:53

## 2021-10-25 NOTE — DISCHARGE NOTE OB - CARE PROVIDER_API CALL
Admission Reconciliation is Completed  Discharge Reconciliation is Not Complete Admission Reconciliation is Completed  Discharge Reconciliation is Completed Parish Boyle)  Obstetrics and Gynecology  [address] [facility], [address]  [place] [address]  Phone: [phone]  Fax: [phone]  Follow Up Time:

## 2021-10-25 NOTE — DISCHARGE NOTE OB - PLAN OF CARE
Patient is stable and doing well upon discharge from the hospital.  She has been counseled regarding postpartum care, modification of her activities and pain management.   She will be seen at outpatient ob/gyn office for postpartum check in about 1-2 weeks for BP check and again 4-6 weeks after delivery.

## 2021-10-25 NOTE — DISCHARGE NOTE OB - CARE PLAN
1 Principal Discharge DX:	Single delivery by  section  Assessment and plan of treatment:	Patient is stable and doing well upon discharge from the hospital.  She has been counseled regarding postpartum care, modification of her activities and pain management.   She will be seen at outpatient ob/gyn office for postpartum check in about 1-2 weeks for BP check and again 4-6 weeks after delivery.

## 2021-10-25 NOTE — PROGRESS NOTE ADULT - SUBJECTIVE AND OBJECTIVE BOX
Anesthesia Pain Management Service    SUBJECTIVE:  Pain Scale Score	At rest: ___ 	With Activity: ___ 	[x ] Refer to charted pain scores    THERAPY:    s/p __.1______ mg PF morphine on _10/22_____      MEDICATIONS  (STANDING):  acetaminophen     Tablet .. 975 milliGRAM(s) Oral <User Schedule>  diphtheria/tetanus/pertussis (acellular) Vaccine (ADAcel) 0.5 milliLiter(s) IntraMuscular once  heparin   Injectable 5000 Unit(s) SubCutaneous every 12 hours  ibuprofen  Tablet. 600 milliGRAM(s) Oral every 6 hours  influenza   Vaccine 0.5 milliLiter(s) IntraMuscular once  ketorolac   Injectable 30 milliGRAM(s) IV Push every 6 hours  labetalol 200 milliGRAM(s) Oral two times a day  lactated ringers. 1000 milliLiter(s) (125 mL/Hr) IV Continuous <Continuous>  magnesium sulfate  IVPB 4 Gram(s) IV Intermittent once  magnesium sulfate Infusion 2 Gm/Hr (50 mL/Hr) IV Continuous <Continuous>  magnesium sulfate Infusion 2 Gm/Hr (50 mL/Hr) IV Continuous <Continuous>  misoprostol Oral Solution 40 MICROGram(s) Oral every 2 hours  morphine PF Spinal 0.1 milliGRAM(s) IntraThecal. once  ondansetron Injectable 4 milliGRAM(s) IV Push once  oxytocin Infusion 333.333 milliUNIT(s)/Min (1000 mL/Hr) IV Continuous <Continuous>  oxytocin Infusion 333.333 milliUNIT(s)/Min (1000 mL/Hr) IV Continuous <Continuous>    MEDICATIONS  (PRN):  dexAMETHasone  Injectable 4 milliGRAM(s) IV Push every 6 hours PRN Nausea  diphenhydrAMINE 25 milliGRAM(s) Oral every 6 hours PRN Pruritus  lanolin Ointment 1 Application(s) Topical every 6 hours PRN Sore Nipples  magnesium hydroxide Suspension 30 milliLiter(s) Oral two times a day PRN Constipation  naloxone Injectable 0.1 milliGRAM(s) IV Push every 3 minutes PRN For ANY of the following changes in patient status:  A. Breaths Per Minute LESS THAN 10, B. Oxygen saturation LESS THAN 90%, C. Sedation score of 6 for Stop After: 4 Times  ondansetron Injectable 4 milliGRAM(s) IV Push every 6 hours PRN Nausea  oxyCODONE    IR 5 milliGRAM(s) Oral every 3 hours PRN Mild Pain (1 - 3)  oxyCODONE    IR 10 milliGRAM(s) Oral every 3 hours PRN Severe Pain (7 - 10)  oxyCODONE    IR 5 milliGRAM(s) Oral every 3 hours PRN Moderate to Severe Pain (4-10)  oxyCODONE    IR 5 milliGRAM(s) Oral once PRN Moderate to Severe Pain (4-10)  simethicone 80 milliGRAM(s) Chew every 4 hours PRN Gas      OBJECTIVE:    Sedation Score:	[ x] Alert	[ ] Drowsy	[ ] Arousable	[ ] Asleep	[ ] Unresponsive    Side Effects:	[ ] None	[ ] Nausea	[ x] Vomiting	[ x] Pruritus  		  [ ] Weakness		[ ] Numbness	[ ] Other:    Vital Signs Last 24 Hrs  T(C): 36.5 (23 Oct 2021 08:18), Max: 36.8 (22 Oct 2021 19:12)  T(F): 97.7 (23 Oct 2021 08:18), Max: 98.24 (22 Oct 2021 19:12)  HR: 100 (23 Oct 2021 08:18) (77 - 140)  BP: 129/83 (23 Oct 2021 08:18) (122/95 - 171/105)  BP(mean): 106 (22 Oct 2021 23:40) (106 - 124)  RR: 18 (23 Oct 2021 08:18) (12 - 21)  SpO2: 97% (23 Oct 2021 08:18) (93% - 100%)    ASSESSMENT/ PLAN  [x ] Patient transitioned to prn analgesics  [x ] Pain management per primary service, pain service to sign off   [x ]Documentation and Verification of current medications     Comments:
OB Progress Note: LTCS, POD#2    S: 32yo POD#2 s/p LTCS. Pain is well controlled. She is tolerating a regular diet and passing flatus. She is voiding spontaneously, and ambulating without difficulty. Denies CP/SOB. Denies lightheadedness/dizziness. Denies N/V. Denies sxs od PEC: denies headache, visual changes, RUQ pain, respiratory distress    O:  Vitals:  Vital Signs Last 24 Hrs  T(C): 36.6 (24 Oct 2021 00:14), Max: 36.9 (23 Oct 2021 22:33)  T(F): 97.8 (24 Oct 2021 00:14), Max: 98.4 (23 Oct 2021 22:33)  HR: 84 (24 Oct 2021 00:14) (73 - 105)  BP: 111/68 (24 Oct 2021 00:14) (111/68 - 152/100)  BP(mean): --  RR: 18 (24 Oct 2021 00:14) (16 - 18)  SpO2: 96% (24 Oct 2021 00:14) (96% - 99%)    MEDICATIONS  (STANDING):  acetaminophen     Tablet .. 975 milliGRAM(s) Oral <User Schedule>  diphtheria/tetanus/pertussis (acellular) Vaccine (ADAcel) 0.5 milliLiter(s) IntraMuscular once  heparin   Injectable 5000 Unit(s) SubCutaneous every 12 hours  ibuprofen  Tablet. 600 milliGRAM(s) Oral every 6 hours  influenza   Vaccine 0.5 milliLiter(s) IntraMuscular once  labetalol 200 milliGRAM(s) Oral three times a day  lactated ringers. 1000 milliLiter(s) (125 mL/Hr) IV Continuous <Continuous>  magnesium sulfate  IVPB 4 Gram(s) IV Intermittent once  magnesium sulfate Infusion 2 Gm/Hr (50 mL/Hr) IV Continuous <Continuous>  magnesium sulfate Infusion 2 Gm/Hr (50 mL/Hr) IV Continuous <Continuous>  misoprostol Oral Solution 40 MICROGram(s) Oral every 2 hours  ondansetron Injectable 4 milliGRAM(s) IV Push once  oxytocin Infusion 333.333 milliUNIT(s)/Min (1000 mL/Hr) IV Continuous <Continuous>  oxytocin Infusion 333.333 milliUNIT(s)/Min (1000 mL/Hr) IV Continuous <Continuous>      MEDICATIONS  (PRN):  diphenhydrAMINE 25 milliGRAM(s) Oral every 6 hours PRN Pruritus  lanolin Ointment 1 Application(s) Topical every 6 hours PRN Sore Nipples  magnesium hydroxide Suspension 30 milliLiter(s) Oral two times a day PRN Constipation  oxyCODONE    IR 5 milliGRAM(s) Oral every 3 hours PRN Moderate to Severe Pain (4-10)  oxyCODONE    IR 5 milliGRAM(s) Oral once PRN Moderate to Severe Pain (4-10)  simethicone 80 milliGRAM(s) Chew every 4 hours PRN Gas      Labs:  Blood type: O Positive  Rubella IgG: RPR: Negative                          11.2<L>   7.65 >-----------< 172    ( 10-22 @ 18:54 )             34.0<L>                        11.7   7.04 >-----------< 193    ( 10-22 @ 12:04 )             36.2    10-22-21 @ 18:54      132<L>  |  99  |  5<L>  ----------------------------<  84  3.6   |  17<L>  |  0.60    10-22-21 @ 12:04      135  |  102  |  6<L>  ----------------------------<  66<L>  4.0   |  19<L>  |  0.66        Ca    7.7<L>      22 Oct 2021 18:54  Ca    9.1      22 Oct 2021 12:04  Mg     6.4<H>     10-23  Mg     6.4<H>     10-23  Mg     5.9<H>     10-23  Mg     5.4<H>     10-23  Mg     5.2<H>     10-22  Mg     5.1<H>     10-22    TPro  6.0  /  Alb  3.3  /  TBili  0.2  /  DBili  x   /  AST  13  /  ALT  13  /  AlkPhos  272<H>  10-22-21 @ 18:54  TPro  6.5  /  Alb  3.6  /  TBili  0.2  /  DBili  x   /  AST  13  /  ALT  12  /  AlkPhos  289<H>  10-22-21 @ 12:04      PE:  General: NAD  Abdomen: Soft, appropriately tender, incision c/d/i.  Extremities: No erythema, no pitting edema
OB Progress Note    S: Patient seen and examined at bedside. Pain well controlled, tolerating regular diet, passing flatus, ambulating without difficulty, voiding spontaneously. Denies heavy vaginal bleeding, N/V, CP/SOB/lightheadedness/dizziness, headache, visual disturbances, epigastric pain.     O:   Vital Signs Last 24 Hrs  T(C): 36.4 (23 Oct 2021 00:55), Max: 36.8 (22 Oct 2021 19:12)  T(F): 97.5 (23 Oct 2021 00:55), Max: 98.24 (22 Oct 2021 19:12)  HR: 95 (23 Oct 2021 03:01) (82 - 140)  BP: 145/92 (23 Oct 2021 03:01) (122/95 - 171/105)  BP(mean): 106 (22 Oct 2021 23:40) (106 - 124)  RR: 18 (23 Oct 2021 03:01) (12 - 21)  SpO2: 96% (23 Oct 2021 03:01) (93% - 100%)    Labs:  Blood type: O Positive  Rubella IgG: RPR: Negative                          11.2<L>   7.65 >-----------< 172    ( 10-22 @ 18:54 )             34.0<L>                        11.7   7.04 >-----------< 193    ( 10-22 @ 12:04 )             36.2    10-22-21 @ 18:54      132<L>  |  99  |  5<L>  ----------------------------<  84  3.6   |  17<L>  |  0.60    10-22-21 @ 12:04      135  |  102  |  6<L>  ----------------------------<  66<L>  4.0   |  19<L>  |  0.66        Ca    7.7<L>      22 Oct 2021 18:54  Ca    9.1      22 Oct 2021 12:04  Mg     5.4<H>     10-23  Mg     5.2<H>     10-22  Mg     5.1<H>     10-22    TPro  6.0  /  Alb  3.3  /  TBili  0.2  /  DBili  x   /  AST  13  /  ALT  13  /  AlkPhos  272<H>  10-22-21 @ 18:54  TPro  6.5  /  Alb  3.6  /  TBili  0.2  /  DBili  x   /  AST  13  /  ALT  12  /  AlkPhos  289<H>  10-22-21 @ 12:04          PE:  General: NAD  Abdomen: soft, nontender, nondistended, fundus firm  Incision: Pfannensteil incision c/d/i.  : No heavy vaginal bleeding  Extremities: No erythema, no pitting edema    
OB Progress Note    S: Patient seen and examined at bedside. Pain well controlled, tolerating regular diet, passing flatus, ambulating without difficulty, voiding spontaneously. Denies heavy vaginal bleeding, N/V, CP/SOB/lightheadedness/dizziness, headache, visual disturbances, epigastric pain.     O:   Vital Signs Last 24 Hrs  T(C): 36.8 (25 Oct 2021 01:10), Max: 36.9 (24 Oct 2021 08:08)  T(F): 98.3 (25 Oct 2021 01:10), Max: 98.4 (24 Oct 2021 08:08)  HR: 87 (25 Oct 2021 01:10) (67 - 95)  BP: 113/80 (25 Oct 2021 01:10) (113/80 - 131/93)  BP(mean): --  RR: 18 (25 Oct 2021 01:10) (18 - 18)  SpO2: 95% (25 Oct 2021 01:10) (95% - 99%)    Labs:  Blood type: O Positive  Rubella IgG: RPR: Negative                          11.2<L>   7.65 >-----------< 172    ( 10-22 @ 18:54 )             34.0<L>                        11.7   7.04 >-----------< 193    ( 10-22 @ 12:04 )             36.2    10-22-21 @ 18:54      132<L>  |  99  |  5<L>  ----------------------------<  84  3.6   |  17<L>  |  0.60    10-22-21 @ 12:04      135  |  102  |  6<L>  ----------------------------<  66<L>  4.0   |  19<L>  |  0.66        Ca    7.7<L>      22 Oct 2021 18:54  Ca    9.1      22 Oct 2021 12:04  Mg     6.4<H>     10-23  Mg     6.4<H>     10-23  Mg     5.9<H>     10-23  Mg     5.4<H>     10-23  Mg     5.2<H>     10-22  Mg     5.1<H>     10-22    TPro  6.0  /  Alb  3.3  /  TBili  0.2  /  DBili  x   /  AST  13  /  ALT  13  /  AlkPhos  272<H>  10-22-21 @ 18:54  TPro  6.5  /  Alb  3.6  /  TBili  0.2  /  DBili  x   /  AST  13  /  ALT  12  /  AlkPhos  289<H>  10-22-21 @ 12:04          PE:  General: NAD  Abdomen: soft, nontender, nondistended, fundus firm  Incision: Pfannensteil incision c/d/i.  : No heavy vaginal bleeding  Extremities: No erythema, no pitting edema

## 2021-10-25 NOTE — DISCHARGE NOTE OB - COMMENTS
Check blood pressure 2 x daily.  Notify md for blood pressure greater than 150/98 and/or s/s hypertension

## 2021-10-25 NOTE — PROGRESS NOTE ADULT - ASSESSMENT
A/P: 32yo POD#3 s/p pLTCS 2/2 sPEC and unremitting HA. Headache resolved s/p delivery of infant. Patient is stable and doing well post-operatively.      #sPEC  - s/p Mg for seizure PPx (10/22-10/23)  - continue Labetalol 200BID   - monitor BPs  - HELLP labs wnl    #Postpartum  - HSQ and SCDs for DVT PPx  - Regular diet  - OOB, encourage ambulation  - Continue motrin, tylenol, oxycodone PRN for pain control.      LOIS Vu, PGY-3

## 2021-10-25 NOTE — DISCHARGE NOTE OB - HOSPITAL COURSE
Patient delivered by  without any complications.  Her postpartum course was uneventful, and she reached appropriate postoperative milestones.  Elevated blood pressures were treated w/ Labetalol 200mg orally TID with good control.  There was no excessive vaginal bleeding.  Her wound has been healing well.  There has not been any clinical signs or symptoms of postop infection.  She was discharged home on oral pain medications.  Discharge and follow up instructions discussed with patient.

## 2021-10-25 NOTE — PROGRESS NOTE ADULT - ATTENDING COMMENTS
I have personally seen, examined, and participated in the care of this patient. I have reviewed all pertinent clinical information, including history, physical exam, plan, and the Resident 's note and agree except as noted.     Labetalol was increased yesterday.  Bp's better.  Track bps.
I have personally seen, examined, and participated in the care of this patient. I have reviewed all pertinent clinical information, including history, physical exam, plan, and the Resident 's note and agree except as noted.     Paitent vomits from fruit.  Tolerating other food.          abd- soft, nontender, no rebound.           ut- firm, nontender.    ass- food sensitivity ,  Mg effect.   No evidence of obstruction or ileus.
Ob Attg Note:  Pt is doing well.  I agree w/ the daily note entered today, and the plan of care.  pt is cleared to be discharged today  Will continue Labetalol 200mg po tid.  f/u at the office next week or prn.  home bp monitoring discussed and instructed.

## 2021-10-25 NOTE — DISCHARGE NOTE OB - DO NOT DRIVE OR OPERATE HEAVY MACHINERY WHEN TAKING NARCOTICS/PRESCRIPTION PAIN MEDICATION THAT CAN CHANGE YOUR MENTAL STATE OR CAUSE DROWSINESS
----- Message from Manjeet Barrios MD sent at 3/31/2020  1:58 PM EDT -----  Colonoscopy with MAC when able to schedule Statement Selected

## 2021-10-25 NOTE — DISCHARGE NOTE OB - PATIENT PORTAL LINK FT
You can access the FollowMyHealth Patient Portal offered by [facility] by registering at the following website: [email or web address]. By joining [facility]’s FollowMyHealth portal, you will also be able to view your health information using other applications (apps) compatible with our system.

## 2021-10-25 NOTE — DISCHARGE NOTE OB - MEDICATION SUMMARY - MEDICATIONS TO TAKE
I will START or STAY ON the medications listed below when I get home from the hospital:    ibuprofen 200 mg oral capsule  -- up to 3 cap(s) by mouth every 6 hours, As Needed  -- Indication: For pain, cramps or fever    acetaminophen 500 mg oral tablet  -- 1 or 2 tab(s) by mouth every 6 hours, As Needed  -- Indication: For pain, cramps or fever    labetalol 200 mg oral tablet  -- 1 tab(s) by mouth 3 times a day  -- Indication: For blood pressure    lanolin topical ointment  -- 1 application on skin every 6 hours, As needed, Sore Nipples  -- Indication: For cracked or sore nipples    Slow Fe (as elemental iron) 45 mg oral tablet, extended release  -- 1 tab(s) by mouth once a day  -- Indication: For iron    Colace 100 mg oral capsule  -- 1 cap(s) by mouth 2 times a day, As Needed  -- Indication: For constipation    simethicone 80 mg oral tablet, chewable  -- 1 tab(s) by mouth every 4 hours, As needed, Gas  -- Indication: For gas disocomfort

## 2021-10-28 ENCOUNTER — APPOINTMENT (OUTPATIENT)
Dept: CARDIOLOGY | Facility: CLINIC | Age: 31
End: 2021-10-28

## 2022-04-12 ENCOUNTER — TRANSCRIPTION ENCOUNTER (OUTPATIENT)
Age: 32
End: 2022-04-12

## 2022-10-23 ENCOUNTER — NON-APPOINTMENT (OUTPATIENT)
Age: 32
End: 2022-10-23

## 2023-03-23 DIAGNOSIS — J32.9 CHRONIC SINUSITIS, UNSPECIFIED: ICD-10-CM

## 2023-03-28 ENCOUNTER — NON-APPOINTMENT (OUTPATIENT)
Age: 33
End: 2023-03-28

## 2023-03-28 ENCOUNTER — APPOINTMENT (OUTPATIENT)
Dept: NEUROLOGY | Facility: CLINIC | Age: 33
End: 2023-03-28
Payer: COMMERCIAL

## 2023-03-28 VITALS
TEMPERATURE: 98 F | HEART RATE: 80 BPM | HEIGHT: 67 IN | SYSTOLIC BLOOD PRESSURE: 130 MMHG | BODY MASS INDEX: 21.19 KG/M2 | DIASTOLIC BLOOD PRESSURE: 84 MMHG | OXYGEN SATURATION: 98 % | WEIGHT: 135 LBS

## 2023-03-28 DIAGNOSIS — Z78.9 OTHER SPECIFIED HEALTH STATUS: ICD-10-CM

## 2023-03-28 DIAGNOSIS — Z87.59 PERSONAL HISTORY OF OTHER COMPLICATIONS OF PREGNANCY, CHILDBIRTH AND THE PUERPERIUM: ICD-10-CM

## 2023-03-28 DIAGNOSIS — Z82.3 FAMILY HISTORY OF STROKE: ICD-10-CM

## 2023-03-28 DIAGNOSIS — Z82.0 FAMILY HISTORY OF EPILEPSY AND OTHER DISEASES OF THE NERVOUS SYSTEM: ICD-10-CM

## 2023-03-28 DIAGNOSIS — G43.109 MIGRAINE WITH AURA, NOT INTRACTABLE, W/OUT STATUS MIGRAINOSUS: ICD-10-CM

## 2023-03-28 PROCEDURE — 99204 OFFICE O/P NEW MOD 45 MIN: CPT

## 2023-03-28 NOTE — REVIEW OF SYSTEMS
[Migraine Headache] : migraine headaches [As Noted in HPI] : as noted in HPI [Negative] : Respiratory [Fever] : no fever [Chills] : no chills [Confused or Disoriented] : no confusion [Memory Lapses or Loss] : no memory loss [Decr. Concentrating Ability] : no decrease in concentrating ability [Facial Weakness] : no facial weakness [Arm Weakness] : no arm weakness [Hand Weakness] : no hand weakness [Leg Weakness] : no leg weakness [Numbness] : no numbness [Tingling] : no tingling [Seizures] : no convulsions [Dizziness] : no dizziness [Difficulty Walking] : no difficulty walking [Frequent Falls] : not falling [Anxiety] : no anxiety [Depression] : no depression

## 2023-03-28 NOTE — HISTORY OF PRESENT ILLNESS
[FreeTextEntry1] : Neva Calabrese is a 32 year-old woman with PMH preeclampsia, migraines who presented to the office today for evaluation of vision changes. She has hx of migraine headaches but never experienced migraine aura in the past. She describes vision changes as flashing lights or "squiggly" lines in vision for about 5-10 minutes followed by headache. Headache characteristics have not changed. Headaches vary in location and severity and in the past were associated with light sensitivity. Headaches tend to occur about 1-2x/mo around time of menstrual period. She was prescribed rizatriptan in the past but is no longer taking it. She takes Excedrin tension headache with mild to moderate relief. She has no further complaints.

## 2023-03-28 NOTE — DISCUSSION/SUMMARY
[FreeTextEntry1] : Neva Calabrese is a 32 year-old woman with PMH preeclampsia, migraines who presented to the office today for evaluation of vision changes with headaches, likely migraine aura. Neurological exam intact without focal deficits. Plan to begin rizatriptan 10mg PRN for migraines. She was educated on side effects of medication and instructed to call the office if she develops side effects or medication is ineffective for her symptoms. Follow-up with me in 6 months or sooner should the need arise. All of her questions and concerns were addressed.\par \par

## 2023-03-28 NOTE — PHYSICAL EXAM
[FreeTextEntry1] : GENERAL PHYSICAL EXAM:\par GEN: no distress, normal affect\par EYES: sclera white, conjunctiva clear, no nystagmus\par CV: normal rhythm\par PULM: no respiratory distress, normal rhythm and effort\par EXT: no edema, no cyanosis\par MSK: muscle tone and strength normal\par SKIN: warm, dry, no rash or lesion on exposed skin \par \par NEUROLOGICAL EXAM:\par Mental Status\par Orientation: alert and oriented to person, place, time, and situation\par Language: clear and fluent, intact comprehension and repetition\par \par Cranial Nerves\par II: visual fields full to confrontation \par III, IV, VI: PERRL, EOMI\par V, VII: facial sensation and movement intact and symmetric \par VIII: hearing intact \par IX, X: uvula midline, soft palate elevates normally \par XI: BL shoulder shrug intact \par XII: tongue midline\par \par Motor\par Shoulder abd: 5 (R), 5 (L)\par EF/EE: 5 (R), 5 (L)\par WF/WE: 5 (R), 5 (L)\par hand : 5 (R), 5 (L)\par HF/HE: 5 (R), 5 (L)\par KF/KE: 5 (R), 5 (L)\par Tone and bulk are normal in upper and lower limbs\par No pronator drift\par \par Sensation\par Intact to light touch in all 4 EXTs\par \par Reflex\par 2+ in BL biceps, brachioradialis, patella\par \par Coordination\par Normal FTN bilaterally\par Able to perform rapid, alternating movements\par \par Gait\par Normal stance, stride, and pivot turn\par Tandem walk intact\par

## 2023-09-27 ENCOUNTER — APPOINTMENT (OUTPATIENT)
Dept: NEUROLOGY | Facility: CLINIC | Age: 33
End: 2023-09-27

## 2023-09-28 ENCOUNTER — APPOINTMENT (OUTPATIENT)
Dept: NEUROLOGY | Facility: CLINIC | Age: 33
End: 2023-09-28

## 2023-10-27 ENCOUNTER — ASOB RESULT (OUTPATIENT)
Age: 33
End: 2023-10-27

## 2023-10-27 ENCOUNTER — APPOINTMENT (OUTPATIENT)
Dept: ANTEPARTUM | Facility: CLINIC | Age: 33
End: 2023-10-27
Payer: COMMERCIAL

## 2023-10-27 PROCEDURE — 76813 OB US NUCHAL MEAS 1 GEST: CPT

## 2023-10-27 PROCEDURE — 76801 OB US < 14 WKS SINGLE FETUS: CPT

## 2023-12-29 ENCOUNTER — ASOB RESULT (OUTPATIENT)
Age: 33
End: 2023-12-29

## 2023-12-29 ENCOUNTER — APPOINTMENT (OUTPATIENT)
Dept: ANTEPARTUM | Facility: CLINIC | Age: 33
End: 2023-12-29
Payer: COMMERCIAL

## 2023-12-29 PROCEDURE — 76811 OB US DETAILED SNGL FETUS: CPT

## 2024-02-29 ENCOUNTER — ASOB RESULT (OUTPATIENT)
Age: 34
End: 2024-02-29

## 2024-02-29 ENCOUNTER — APPOINTMENT (OUTPATIENT)
Dept: ANTEPARTUM | Facility: CLINIC | Age: 34
End: 2024-02-29
Payer: COMMERCIAL

## 2024-02-29 PROCEDURE — 76816 OB US FOLLOW-UP PER FETUS: CPT

## 2024-04-11 ENCOUNTER — APPOINTMENT (OUTPATIENT)
Dept: ANTEPARTUM | Facility: CLINIC | Age: 34
End: 2024-04-11
Payer: COMMERCIAL

## 2024-04-11 ENCOUNTER — ASOB RESULT (OUTPATIENT)
Age: 34
End: 2024-04-11

## 2024-04-11 PROCEDURE — 76816 OB US FOLLOW-UP PER FETUS: CPT

## 2024-04-11 PROCEDURE — 76819 FETAL BIOPHYS PROFIL W/O NST: CPT | Mod: 59

## 2024-04-12 ENCOUNTER — OUTPATIENT (OUTPATIENT)
Dept: OUTPATIENT SERVICES | Facility: HOSPITAL | Age: 34
LOS: 1 days | End: 2024-04-12
Payer: COMMERCIAL

## 2024-04-12 VITALS — HEART RATE: 85 BPM | DIASTOLIC BLOOD PRESSURE: 90 MMHG | SYSTOLIC BLOOD PRESSURE: 133 MMHG

## 2024-04-12 VITALS — HEART RATE: 80 BPM | OXYGEN SATURATION: 96 %

## 2024-04-12 DIAGNOSIS — O34.219 MATERNAL CARE FOR UNSPECIFIED TYPE SCAR FROM PREVIOUS CESAREAN DELIVERY: ICD-10-CM

## 2024-04-12 DIAGNOSIS — O10.913 UNSPECIFIED PRE-EXISTING HYPERTENSION COMPLICATING PREGNANCY, THIRD TRIMESTER: ICD-10-CM

## 2024-04-12 DIAGNOSIS — O99.353 DISEASES OF THE NERVOUS SYSTEM COMPLICATING PREGNANCY, THIRD TRIMESTER: ICD-10-CM

## 2024-04-12 DIAGNOSIS — G43.909 MIGRAINE, UNSPECIFIED, NOT INTRACTABLE, WITHOUT STATUS MIGRAINOSUS: ICD-10-CM

## 2024-04-12 DIAGNOSIS — O26.899 OTHER SPECIFIED PREGNANCY RELATED CONDITIONS, UNSPECIFIED TRIMESTER: ICD-10-CM

## 2024-04-12 DIAGNOSIS — O09.293 SUPERVISION OF PREGNANCY WITH OTHER POOR REPRODUCTIVE OR OBSTETRIC HISTORY, THIRD TRIMESTER: ICD-10-CM

## 2024-04-12 DIAGNOSIS — O09.523 SUPERVISION OF ELDERLY MULTIGRAVIDA, THIRD TRIMESTER: ICD-10-CM

## 2024-04-12 DIAGNOSIS — Z3A.35 35 WEEKS GESTATION OF PREGNANCY: ICD-10-CM

## 2024-04-12 LAB
ALBUMIN SERPL ELPH-MCNC: 3.4 G/DL — SIGNIFICANT CHANGE UP (ref 3.3–5)
ALP SERPL-CCNC: 212 U/L — HIGH (ref 40–120)
ALT FLD-CCNC: 11 U/L — SIGNIFICANT CHANGE UP (ref 10–45)
ANION GAP SERPL CALC-SCNC: 15 MMOL/L — SIGNIFICANT CHANGE UP (ref 5–17)
APPEARANCE UR: CLEAR — SIGNIFICANT CHANGE UP
AST SERPL-CCNC: 10 U/L — SIGNIFICANT CHANGE UP (ref 10–40)
BASOPHILS # BLD AUTO: 0.02 K/UL — SIGNIFICANT CHANGE UP (ref 0–0.2)
BASOPHILS NFR BLD AUTO: 0.3 % — SIGNIFICANT CHANGE UP (ref 0–2)
BILIRUB SERPL-MCNC: 0.2 MG/DL — SIGNIFICANT CHANGE UP (ref 0.2–1.2)
BILIRUB UR-MCNC: NEGATIVE — SIGNIFICANT CHANGE UP
BLD GP AB SCN SERPL QL: NEGATIVE — SIGNIFICANT CHANGE UP
BUN SERPL-MCNC: 7 MG/DL — SIGNIFICANT CHANGE UP (ref 7–23)
CALCIUM SERPL-MCNC: 9.2 MG/DL — SIGNIFICANT CHANGE UP (ref 8.4–10.5)
CHLORIDE SERPL-SCNC: 101 MMOL/L — SIGNIFICANT CHANGE UP (ref 96–108)
CO2 SERPL-SCNC: 18 MMOL/L — LOW (ref 22–31)
COLOR SPEC: YELLOW — SIGNIFICANT CHANGE UP
CREAT ?TM UR-MCNC: 41 MG/DL — SIGNIFICANT CHANGE UP
CREAT SERPL-MCNC: 0.76 MG/DL — SIGNIFICANT CHANGE UP (ref 0.5–1.3)
DIFF PNL FLD: NEGATIVE — SIGNIFICANT CHANGE UP
EGFR: 105 ML/MIN/1.73M2 — SIGNIFICANT CHANGE UP
EOSINOPHIL # BLD AUTO: 0.02 K/UL — SIGNIFICANT CHANGE UP (ref 0–0.5)
EOSINOPHIL NFR BLD AUTO: 0.3 % — SIGNIFICANT CHANGE UP (ref 0–6)
GLUCOSE SERPL-MCNC: 88 MG/DL — SIGNIFICANT CHANGE UP (ref 70–99)
GLUCOSE UR QL: NEGATIVE MG/DL — SIGNIFICANT CHANGE UP
HCT VFR BLD CALC: 33.3 % — LOW (ref 34.5–45)
HGB BLD-MCNC: 10.9 G/DL — LOW (ref 11.5–15.5)
IMM GRANULOCYTES NFR BLD AUTO: 0.4 % — SIGNIFICANT CHANGE UP (ref 0–0.9)
KETONES UR-MCNC: NEGATIVE MG/DL — SIGNIFICANT CHANGE UP
LDH SERPL L TO P-CCNC: 181 U/L — SIGNIFICANT CHANGE UP (ref 50–242)
LEUKOCYTE ESTERASE UR-ACNC: NEGATIVE — SIGNIFICANT CHANGE UP
LYMPHOCYTES # BLD AUTO: 2.23 K/UL — SIGNIFICANT CHANGE UP (ref 1–3.3)
LYMPHOCYTES # BLD AUTO: 31.7 % — SIGNIFICANT CHANGE UP (ref 13–44)
MCHC RBC-ENTMCNC: 26.1 PG — LOW (ref 27–34)
MCHC RBC-ENTMCNC: 32.7 GM/DL — SIGNIFICANT CHANGE UP (ref 32–36)
MCV RBC AUTO: 79.7 FL — LOW (ref 80–100)
MONOCYTES # BLD AUTO: 0.48 K/UL — SIGNIFICANT CHANGE UP (ref 0–0.9)
MONOCYTES NFR BLD AUTO: 6.8 % — SIGNIFICANT CHANGE UP (ref 2–14)
NEUTROPHILS # BLD AUTO: 4.25 K/UL — SIGNIFICANT CHANGE UP (ref 1.8–7.4)
NEUTROPHILS NFR BLD AUTO: 60.5 % — SIGNIFICANT CHANGE UP (ref 43–77)
NITRITE UR-MCNC: NEGATIVE — SIGNIFICANT CHANGE UP
NRBC # BLD: 0 /100 WBCS — SIGNIFICANT CHANGE UP (ref 0–0)
PH UR: 7 — SIGNIFICANT CHANGE UP (ref 5–8)
PLATELET # BLD AUTO: 219 K/UL — SIGNIFICANT CHANGE UP (ref 150–400)
POTASSIUM SERPL-MCNC: 4 MMOL/L — SIGNIFICANT CHANGE UP (ref 3.5–5.3)
POTASSIUM SERPL-SCNC: 4 MMOL/L — SIGNIFICANT CHANGE UP (ref 3.5–5.3)
PROT ?TM UR-MCNC: <7 MG/DL — SIGNIFICANT CHANGE UP (ref 0–12)
PROT SERPL-MCNC: 6.1 G/DL — SIGNIFICANT CHANGE UP (ref 6–8.3)
PROT UR-MCNC: NEGATIVE MG/DL — SIGNIFICANT CHANGE UP
PROT/CREAT UR-RTO: SIGNIFICANT CHANGE UP (ref 0–0.2)
RBC # BLD: 4.18 M/UL — SIGNIFICANT CHANGE UP (ref 3.8–5.2)
RBC # FLD: 11.8 % — SIGNIFICANT CHANGE UP (ref 10.3–14.5)
RH IG SCN BLD-IMP: POSITIVE — SIGNIFICANT CHANGE UP
SODIUM SERPL-SCNC: 134 MMOL/L — LOW (ref 135–145)
SP GR SPEC: 1.01 — SIGNIFICANT CHANGE UP (ref 1–1.03)
URATE SERPL-MCNC: 4.5 MG/DL — SIGNIFICANT CHANGE UP (ref 2.5–7)
UROBILINOGEN FLD QL: 0.2 MG/DL — SIGNIFICANT CHANGE UP (ref 0.2–1)
WBC # BLD: 7.03 K/UL — SIGNIFICANT CHANGE UP (ref 3.8–10.5)
WBC # FLD AUTO: 7.03 K/UL — SIGNIFICANT CHANGE UP (ref 3.8–10.5)

## 2024-04-12 PROCEDURE — 80053 COMPREHEN METABOLIC PANEL: CPT

## 2024-04-12 PROCEDURE — 86900 BLOOD TYPING SEROLOGIC ABO: CPT

## 2024-04-12 PROCEDURE — 86850 RBC ANTIBODY SCREEN: CPT

## 2024-04-12 PROCEDURE — 84550 ASSAY OF BLOOD/URIC ACID: CPT

## 2024-04-12 PROCEDURE — 85025 COMPLETE CBC W/AUTO DIFF WBC: CPT

## 2024-04-12 PROCEDURE — G0463: CPT

## 2024-04-12 PROCEDURE — 86901 BLOOD TYPING SEROLOGIC RH(D): CPT

## 2024-04-12 PROCEDURE — 82570 ASSAY OF URINE CREATININE: CPT

## 2024-04-12 PROCEDURE — 84156 ASSAY OF PROTEIN URINE: CPT

## 2024-04-12 PROCEDURE — 81003 URINALYSIS AUTO W/O SCOPE: CPT

## 2024-04-12 PROCEDURE — 83615 LACTATE (LD) (LDH) ENZYME: CPT

## 2024-04-12 NOTE — OB PROVIDER TRIAGE NOTE - HISTORY OF PRESENT ILLNESS
34yo     @  35w 6 d     presents with elevated pressure at home. Denies HA, visual changes, epigastric pain.   Denies contractions, VB or LOF has + FM      PNC: Dr Boyle  PNC: AntonTARNOLDO   PNL: GBS positive    All: penicillin (Hives)  hydrocodone (Hives; Rash)  Meds: ASA, PNV, Magnesium, Probiotics  PMHx: Migraines, IBS, gastritic, GERD  PSHx: Steele teeth, C/S  Socialhx: Denies x 3, Denies anxiety or depression  OBhx: 2021  FT  c/s  c/b sPEC/Mg, required labetalol postpartum, 6lbs 2 oz  1st tri SAB--> no D & C  GYNhx: h/o HPV - last pap normal, denies fibroids, ov cysts or STDs

## 2024-04-12 NOTE — OB RN TRIAGE NOTE - FALL HARM RISK - ATTEMPT OOB
74 year old with history of bladder cancer (1.5 years ago), depression, and anxiety presents to ED for worsening anxiety, crying, and stating "I don't want to live anymore". Patient was having on and off pain for weeks. Per daughter, was seen by PCP 6 different times and then recommended to go to [place]. had multiple tests performed which did not show anything. patient admitted to starving herself in attempt to kill herself. transferred to [facility] psych bojorquez and admitted for a week. diagnosed with depression and anxiety. started on trazadone, remeron, and xanax. Was told to follow up with psychiatrist and counselor outpatient. daughter tried arranging these appointments but has not seen yet (and states patient is now refusing to leave house or go to the doctor's). patient went to live at home (lives by herself with family close by). daughter arranged for her to have an aide with her. a few days ago, patient had crying spell and couldn't stop crying. aide called EMS and transferred to [place]. Was in hospital for 48 hours, but not admitted to psych bojorquez. daughter took patient home and has been living with her past few days. woke up today and can't stop crying and ranting per daughter. continues to tell family every day that "I don't want to live anymore". plan is to starve herself. currently refusing to eat. daughter reports 40 lb weight loss in past 6 weeks   PCP Charlette
No

## 2024-04-12 NOTE — OB PROVIDER TRIAGE NOTE - NSOBPROVIDERNOTE_OBGYN_ALL_OB_FT
A/P: 34yo  @35w 6d presents with elevated pressure at home.   - no severe range BPs in triage - 120s-130s/80s-90   - No sx of preeclampsia with severe features at this time  - NST reactive, BPP 8/8     D/w Dr. Montana Mercado, PGY-1

## 2024-04-15 ENCOUNTER — APPOINTMENT (OUTPATIENT)
Dept: ANTEPARTUM | Facility: CLINIC | Age: 34
End: 2024-04-15
Payer: COMMERCIAL

## 2024-04-15 ENCOUNTER — ASOB RESULT (OUTPATIENT)
Age: 34
End: 2024-04-15

## 2024-04-15 PROCEDURE — 76819 FETAL BIOPHYS PROFIL W/O NST: CPT

## 2024-04-16 ENCOUNTER — OUTPATIENT (OUTPATIENT)
Dept: OUTPATIENT SERVICES | Facility: HOSPITAL | Age: 34
LOS: 1 days | End: 2024-04-16
Payer: COMMERCIAL

## 2024-04-16 VITALS
HEART RATE: 86 BPM | TEMPERATURE: 98 F | RESPIRATION RATE: 14 BRPM | HEIGHT: 67 IN | WEIGHT: 182.1 LBS | OXYGEN SATURATION: 97 % | SYSTOLIC BLOOD PRESSURE: 145 MMHG | DIASTOLIC BLOOD PRESSURE: 93 MMHG

## 2024-04-16 DIAGNOSIS — I10 ESSENTIAL (PRIMARY) HYPERTENSION: ICD-10-CM

## 2024-04-16 DIAGNOSIS — Z98.891 HISTORY OF UTERINE SCAR FROM PREVIOUS SURGERY: Chronic | ICD-10-CM

## 2024-04-16 DIAGNOSIS — O34.219 MATERNAL CARE FOR UNSPECIFIED TYPE SCAR FROM PREVIOUS CESAREAN DELIVERY: ICD-10-CM

## 2024-04-16 DIAGNOSIS — Z29.9 ENCOUNTER FOR PROPHYLACTIC MEASURES, UNSPECIFIED: ICD-10-CM

## 2024-04-16 DIAGNOSIS — Z01.818 ENCOUNTER FOR OTHER PREPROCEDURAL EXAMINATION: ICD-10-CM

## 2024-04-16 LAB
ANION GAP SERPL CALC-SCNC: 12 MMOL/L — SIGNIFICANT CHANGE UP (ref 5–17)
BLD GP AB SCN SERPL QL: NEGATIVE — SIGNIFICANT CHANGE UP
BUN SERPL-MCNC: 8 MG/DL — SIGNIFICANT CHANGE UP (ref 7–23)
CALCIUM SERPL-MCNC: 8.5 MG/DL — SIGNIFICANT CHANGE UP (ref 8.4–10.5)
CHLORIDE SERPL-SCNC: 103 MMOL/L — SIGNIFICANT CHANGE UP (ref 96–108)
CO2 SERPL-SCNC: 21 MMOL/L — LOW (ref 22–31)
CREAT SERPL-MCNC: 0.6 MG/DL — SIGNIFICANT CHANGE UP (ref 0.5–1.3)
EGFR: 120 ML/MIN/1.73M2 — SIGNIFICANT CHANGE UP
GLUCOSE SERPL-MCNC: 100 MG/DL — HIGH (ref 70–99)
HCT VFR BLD CALC: 34.5 % — SIGNIFICANT CHANGE UP (ref 34.5–45)
HGB BLD-MCNC: 10.9 G/DL — LOW (ref 11.5–15.5)
MCHC RBC-ENTMCNC: 25.3 PG — LOW (ref 27–34)
MCHC RBC-ENTMCNC: 31.6 GM/DL — LOW (ref 32–36)
MCV RBC AUTO: 80.2 FL — SIGNIFICANT CHANGE UP (ref 80–100)
NRBC # BLD: 0 /100 WBCS — SIGNIFICANT CHANGE UP (ref 0–0)
PLATELET # BLD AUTO: 217 K/UL — SIGNIFICANT CHANGE UP (ref 150–400)
POTASSIUM SERPL-MCNC: 3.8 MMOL/L — SIGNIFICANT CHANGE UP (ref 3.5–5.3)
POTASSIUM SERPL-SCNC: 3.8 MMOL/L — SIGNIFICANT CHANGE UP (ref 3.5–5.3)
RBC # BLD: 4.3 M/UL — SIGNIFICANT CHANGE UP (ref 3.8–5.2)
RBC # FLD: 11.9 % — SIGNIFICANT CHANGE UP (ref 10.3–14.5)
RH IG SCN BLD-IMP: POSITIVE — SIGNIFICANT CHANGE UP
SODIUM SERPL-SCNC: 136 MMOL/L — SIGNIFICANT CHANGE UP (ref 135–145)
WBC # BLD: 6.13 K/UL — SIGNIFICANT CHANGE UP (ref 3.8–10.5)
WBC # FLD AUTO: 6.13 K/UL — SIGNIFICANT CHANGE UP (ref 3.8–10.5)

## 2024-04-16 PROCEDURE — 80048 BASIC METABOLIC PNL TOTAL CA: CPT

## 2024-04-16 PROCEDURE — 86850 RBC ANTIBODY SCREEN: CPT

## 2024-04-16 PROCEDURE — 86901 BLOOD TYPING SEROLOGIC RH(D): CPT

## 2024-04-16 PROCEDURE — G0463: CPT

## 2024-04-16 PROCEDURE — 85027 COMPLETE CBC AUTOMATED: CPT

## 2024-04-16 PROCEDURE — 86900 BLOOD TYPING SEROLOGIC ABO: CPT

## 2024-04-16 RX ORDER — DOCUSATE SODIUM 100 MG
1 CAPSULE ORAL
Qty: 0 | Refills: 0 | DISCHARGE

## 2024-04-16 RX ORDER — FERROUS SULFATE 325(65) MG
1 TABLET ORAL
Qty: 0 | Refills: 0 | DISCHARGE

## 2024-04-16 RX ORDER — ACETAMINOPHEN 500 MG
2 TABLET ORAL
Qty: 0 | Refills: 0 | DISCHARGE

## 2024-04-16 RX ORDER — IBUPROFEN 200 MG
3 TABLET ORAL
Qty: 0 | Refills: 0 | DISCHARGE

## 2024-04-16 NOTE — OB PST NOTE - NS_OBGYNHISTORY_OBGYN_ALL_OB_FT
34 y/o F, PMHx, , primary  at 38 weeks (10/2021) with severe pre-eclampsia and was discharged home on Labetalol.  Current pregnancy, hx of pre-eclampsia, on Aspirin.  Pt presents for PST for repeat  with bilateral tubal ligation with Dr. Parish Boyle scheduled for 2024.  Pt denies any fever, chills, N/V/D, SOB, CP, palpitations, dizziness, HA, urinary or BM issues.

## 2024-04-16 NOTE — OB PST NOTE - FALL HARM RISK - UNIVERSAL INTERVENTIONS
Bed in lowest position, wheels locked, appropriate side rails in place/Call bell, personal items and telephone in reach/Instruct patient to call for assistance before getting out of bed or chair/Non-slip footwear when patient is out of bed/[place] to call system/Physically safe environment - no spills, clutter or unnecessary equipment/Purposeful Proactive Rounding/Room/bathroom lighting operational, light cord in reach

## 2024-04-16 NOTE — OB PST NOTE - HISTORY OF PRESENT ILLNESS
34 y/o F, PMHx, , primary  at 38 weeks (10/2021) with severe pre-eclampsia and was discharged home on Labetalol.  Current pregnancy, hx of pre-eclampsia, on Aspirin.  Pt presents for PST for repeat  with bilateral tubal ligation with Dr. Parish Boyle scheduled for 2024.  Pt denies any fever, chills, N/V/D, SOB, CP, palpitations, dizziness, HA, urinary or BM issues. 32 y/o F, PMHx, , primary  at 38 weeks (10/2021) with severe pre-eclampsia and was discharged home on Labetalol.  Current pregnancy, hx of pre-eclampsia, on Aspirin 81 mg, probiotic, magnesium, and prenatal.  Pt presents for PST for repeat  with bilateral tubal ligation with Dr. Parish Boyle scheduled for 2024.  Pt denies any fever, chills, N/V/D, SOB, CP, palpitations, dizziness, HA, urinary or BM issues. 34 y/o F, PMHx, , primary  at 38 weeks (10/2021) with severe pre-eclampsia and was discharged home on Labetalol.  Current pregnancy, hx of pre-eclampsia, on Aspirin 81 mg, probiotic, magnesium, and prenatal.  Pt presents for PST for repeat  with bilateral tubal ligation with Dr. Parish Boyle scheduled for 2024.  Pt denies any fever, chills, N/V/D, SOB, CP, palpitations, dizziness, HA, urinary or BM issues.
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
## 2024-04-16 NOTE — OB PST NOTE - PROBLEM SELECTOR PLAN 1
Pt scheduled for repeat  with bilateral tubal ligation with Dr. Parish Boyle on 2024.  -Pre op instructions provided.  -Chlorhexidine wash and instructions provided.  LABS: CBC, BMP, T&S done at [facility].

## 2024-04-16 NOTE — OB PST NOTE - ASSESSMENT
DASI score: 7.68  DASI activity: able to walk long distance, incline/stairs, carry groceries, shopping self care without SOB, CP.  Loose teeth or denture: denies any loose teeth, or dentures.    LAURAI VTE 2.0 SCORE [CLOT updated 2019]    AGE RELATED RISK FACTORS                                                       MOBILITY RELATED FACTORS  [ ] Age 41-60 years                                            (1 Point)                    [ ] Bed rest                                                        (1 Point)  [ ] Age: 61-74 years                                           (2 Points)                  [ ] Plaster cast                                                   (2 Points)  [ ] Age= 75 years                                              (3 Points)                    [ ] Bed bound for more than 72 hours                 (2 Points)    DISEASE RELATED RISK FACTORS                                               GENDER SPECIFIC FACTORS  [ ] Edema in the lower extremities                       (1 Point)              [ ] Pregnancy                                                     (1 Point)  [ ] Varicose veins                                               (1 Point)                     [ ] Post-partum < 6 weeks                                   (1 Point)             [ X] BMI > 25 Kg/m2                                            (1 Point)                     [ ] Hormonal therapy  or oral contraception          (1 Point)                 [ ] Sepsis (in the previous month)                        (1 Point)               [ ] History of pregnancy complications                 (1 point)  [ ] Pneumonia or serious lung disease                                               [ ] Unexplained or recurrent                     (1 Point)           (in the previous month)                               (1 Point)  [ ] Abnormal pulmonary function test                     (1 Point)                 SURGERY RELATED RISK FACTORS  [ ] Acute myocardial infarction                              (1 Point)               [X]  Section                                             (1 Point)  [ ] Congestive heart failure (in the previous month)  (1 Point)      [ ] Minor surgery                                                  (1 Point)   [ ] Inflammatory bowel disease                             (1 Point)               [ ] Arthroscopic surgery                                        (2 Points)  [ ] Central venous access                                      (2 Points)                [ ] General surgery lasting more than 45 minutes (2 points)  [ ] Malignancy- Present or previous                   (2 Points)                [ ] Elective arthroplasty                                         (5 points)    [ ] Stroke (in the previous month)                          (5 Points)                                                                                                                                                           HEMATOLOGY RELATED FACTORS                                                 TRAUMA RELATED RISK FACTORS  [ ] Prior episodes of VTE                                     (3 Points)                [ ] Fracture of the hip, pelvis, or leg                       (5 Points)  [ ] Positive family history for VTE                         (3 Points)             [ ] Acute spinal cord injury (in the previous month)  (5 Points)  [ ] Prothrombin 63577 A                                     (3 Points)               [ ] Paralysis  (less than 1 month)                             (5 Points)  [ ] Factor V Leiden                                             (3 Points)                  [ ] Multiple Trauma within 1 month                        (5 Points)  [ ] Lupus anticoagulants                                     (3 Points)                                                           [ ] Anticardiolipin antibodies                               (3 Points)                                                       [ ] High homocysteine in the blood                      (3 Points)                                             [ ] Other congenital or acquired thrombophilia      (3 Points)                                                [ ] Heparin induced thrombocytopenia                  (3 Points)                                     Total Score [    2      ]

## 2024-04-18 ENCOUNTER — APPOINTMENT (OUTPATIENT)
Dept: ANTEPARTUM | Facility: CLINIC | Age: 34
End: 2024-04-18
Payer: COMMERCIAL

## 2024-04-18 ENCOUNTER — TRANSCRIPTION ENCOUNTER (OUTPATIENT)
Age: 34
End: 2024-04-18

## 2024-04-18 ENCOUNTER — ASOB RESULT (OUTPATIENT)
Age: 34
End: 2024-04-18

## 2024-04-18 PROCEDURE — 76819 FETAL BIOPHYS PROFIL W/O NST: CPT

## 2024-04-19 ENCOUNTER — INPATIENT (INPATIENT)
Facility: HOSPITAL | Age: 34
LOS: 5 days | Discharge: HOME | End: 2024-04-25
Attending: OBSTETRICS & GYNECOLOGY | Admitting: OBSTETRICS & GYNECOLOGY
Payer: COMMERCIAL

## 2024-04-19 VITALS — OXYGEN SATURATION: 99 % | HEART RATE: 86 BPM

## 2024-04-19 DIAGNOSIS — K08.409 PARTIAL LOSS OF TEETH, UNSPECIFIED CAUSE, UNSPECIFIED CLASS: Chronic | ICD-10-CM

## 2024-04-19 DIAGNOSIS — Z3A.36 36 WEEKS GESTATION OF PREGNANCY: ICD-10-CM

## 2024-04-19 DIAGNOSIS — Z98.891 HISTORY OF UTERINE SCAR FROM PREVIOUS SURGERY: Chronic | ICD-10-CM

## 2024-04-19 DIAGNOSIS — O26.899 OTHER SPECIFIED PREGNANCY RELATED CONDITIONS, UNSPECIFIED TRIMESTER: ICD-10-CM

## 2024-04-19 DIAGNOSIS — Z34.80 ENCOUNTER FOR SUPERVISION OF OTHER NORMAL PREGNANCY, UNSPECIFIED TRIMESTER: ICD-10-CM

## 2024-04-19 PROBLEM — O14.90 UNSPECIFIED PRE-ECLAMPSIA, UNSPECIFIED TRIMESTER: Chronic | Status: ACTIVE | Noted: 2024-04-16

## 2024-04-19 LAB
ALBUMIN SERPL ELPH-MCNC: 3.5 G/DL — SIGNIFICANT CHANGE UP (ref 3.3–5)
ALP SERPL-CCNC: 230 U/L — HIGH (ref 40–120)
ALT FLD-CCNC: 11 U/L — SIGNIFICANT CHANGE UP (ref 10–45)
ANION GAP SERPL CALC-SCNC: 13 MMOL/L — SIGNIFICANT CHANGE UP (ref 5–17)
APPEARANCE UR: CLEAR — SIGNIFICANT CHANGE UP
AST SERPL-CCNC: 8 U/L — LOW (ref 10–40)
BASOPHILS # BLD AUTO: 0.05 K/UL — SIGNIFICANT CHANGE UP (ref 0–0.2)
BASOPHILS NFR BLD AUTO: 0.7 % — SIGNIFICANT CHANGE UP (ref 0–2)
BILIRUB SERPL-MCNC: 0.2 MG/DL — SIGNIFICANT CHANGE UP (ref 0.2–1.2)
BILIRUB UR-MCNC: NEGATIVE — SIGNIFICANT CHANGE UP
BLD GP AB SCN SERPL QL: NEGATIVE — SIGNIFICANT CHANGE UP
BUN SERPL-MCNC: 7 MG/DL — SIGNIFICANT CHANGE UP (ref 7–23)
CALCIUM SERPL-MCNC: 9.4 MG/DL — SIGNIFICANT CHANGE UP (ref 8.4–10.5)
CHLORIDE SERPL-SCNC: 103 MMOL/L — SIGNIFICANT CHANGE UP (ref 96–108)
CO2 SERPL-SCNC: 19 MMOL/L — LOW (ref 22–31)
COLOR SPEC: YELLOW — SIGNIFICANT CHANGE UP
CREAT ?TM UR-MCNC: 68 MG/DL — SIGNIFICANT CHANGE UP
CREAT SERPL-MCNC: 0.59 MG/DL — SIGNIFICANT CHANGE UP (ref 0.5–1.3)
DIFF PNL FLD: NEGATIVE — SIGNIFICANT CHANGE UP
EGFR: 120 ML/MIN/1.73M2 — SIGNIFICANT CHANGE UP
EOSINOPHIL # BLD AUTO: 0.02 K/UL — SIGNIFICANT CHANGE UP (ref 0–0.5)
EOSINOPHIL NFR BLD AUTO: 0.3 % — SIGNIFICANT CHANGE UP (ref 0–6)
GLUCOSE SERPL-MCNC: 75 MG/DL — SIGNIFICANT CHANGE UP (ref 70–99)
GLUCOSE UR QL: NEGATIVE MG/DL — SIGNIFICANT CHANGE UP
HCT VFR BLD CALC: 34 % — LOW (ref 34.5–45)
HGB BLD-MCNC: 11.3 G/DL — LOW (ref 11.5–15.5)
IMM GRANULOCYTES NFR BLD AUTO: 0.6 % — SIGNIFICANT CHANGE UP (ref 0–0.9)
KETONES UR-MCNC: NEGATIVE MG/DL — SIGNIFICANT CHANGE UP
LDH SERPL L TO P-CCNC: 189 U/L — SIGNIFICANT CHANGE UP (ref 50–242)
LEUKOCYTE ESTERASE UR-ACNC: NEGATIVE — SIGNIFICANT CHANGE UP
LYMPHOCYTES # BLD AUTO: 1.88 K/UL — SIGNIFICANT CHANGE UP (ref 1–3.3)
LYMPHOCYTES # BLD AUTO: 26.1 % — SIGNIFICANT CHANGE UP (ref 13–44)
MAGNESIUM SERPL-MCNC: 4.6 MG/DL — HIGH (ref 1.6–2.6)
MCHC RBC-ENTMCNC: 26.2 PG — LOW (ref 27–34)
MCHC RBC-ENTMCNC: 33.2 GM/DL — SIGNIFICANT CHANGE UP (ref 32–36)
MCV RBC AUTO: 78.7 FL — LOW (ref 80–100)
MONOCYTES # BLD AUTO: 0.62 K/UL — SIGNIFICANT CHANGE UP (ref 0–0.9)
MONOCYTES NFR BLD AUTO: 8.6 % — SIGNIFICANT CHANGE UP (ref 2–14)
NEUTROPHILS # BLD AUTO: 4.59 K/UL — SIGNIFICANT CHANGE UP (ref 1.8–7.4)
NEUTROPHILS NFR BLD AUTO: 63.7 % — SIGNIFICANT CHANGE UP (ref 43–77)
NITRITE UR-MCNC: NEGATIVE — SIGNIFICANT CHANGE UP
NRBC # BLD: 0 /100 WBCS — SIGNIFICANT CHANGE UP (ref 0–0)
PH UR: 8 — SIGNIFICANT CHANGE UP (ref 5–8)
PLATELET # BLD AUTO: 193 K/UL — SIGNIFICANT CHANGE UP (ref 150–400)
POTASSIUM SERPL-MCNC: 3.9 MMOL/L — SIGNIFICANT CHANGE UP (ref 3.5–5.3)
POTASSIUM SERPL-SCNC: 3.9 MMOL/L — SIGNIFICANT CHANGE UP (ref 3.5–5.3)
PROT ?TM UR-MCNC: 9 MG/DL — SIGNIFICANT CHANGE UP (ref 0–12)
PROT SERPL-MCNC: 6.5 G/DL — SIGNIFICANT CHANGE UP (ref 6–8.3)
PROT UR-MCNC: NEGATIVE MG/DL — SIGNIFICANT CHANGE UP
PROT/CREAT UR-RTO: 0.1 RATIO — SIGNIFICANT CHANGE UP (ref 0–0.2)
RBC # BLD: 4.32 M/UL — SIGNIFICANT CHANGE UP (ref 3.8–5.2)
RBC # FLD: 11.8 % — SIGNIFICANT CHANGE UP (ref 10.3–14.5)
RH IG SCN BLD-IMP: POSITIVE — SIGNIFICANT CHANGE UP
SODIUM SERPL-SCNC: 135 MMOL/L — SIGNIFICANT CHANGE UP (ref 135–145)
SP GR SPEC: 1.01 — SIGNIFICANT CHANGE UP (ref 1–1.03)
T PALLIDUM AB TITR SER: NEGATIVE — SIGNIFICANT CHANGE UP
URATE SERPL-MCNC: 4.3 MG/DL — SIGNIFICANT CHANGE UP (ref 2.5–7)
UROBILINOGEN FLD QL: 0.2 MG/DL — SIGNIFICANT CHANGE UP (ref 0.2–1)
WBC # BLD: 7.2 K/UL — SIGNIFICANT CHANGE UP (ref 3.8–10.5)
WBC # FLD AUTO: 7.2 K/UL — SIGNIFICANT CHANGE UP (ref 3.8–10.5)

## 2024-04-19 PROCEDURE — 59514 CESAREAN DELIVERY ONLY: CPT | Mod: AS,U7

## 2024-04-19 PROCEDURE — 58700 REMOVAL OF FALLOPIAN TUBE: CPT | Mod: AS

## 2024-04-19 PROCEDURE — 88302 TISSUE EXAM BY PATHOLOGIST: CPT | Mod: 26

## 2024-04-19 RX ORDER — SODIUM CHLORIDE 9 MG/ML
1000 INJECTION, SOLUTION INTRAVENOUS
Refills: 0 | Status: DISCONTINUED | OUTPATIENT
Start: 2024-04-19 | End: 2024-04-19

## 2024-04-19 RX ORDER — ONDANSETRON 8 MG/1
4 TABLET, FILM COATED ORAL EVERY 6 HOURS
Refills: 0 | Status: DISCONTINUED | OUTPATIENT
Start: 2024-04-19 | End: 2024-04-25

## 2024-04-19 RX ORDER — MAGNESIUM SULFATE 500 MG/ML
4 VIAL (ML) INJECTION ONCE
Refills: 0 | Status: COMPLETED | OUTPATIENT
Start: 2024-04-19 | End: 2024-04-19

## 2024-04-19 RX ORDER — IBUPROFEN 200 MG
600 TABLET ORAL EVERY 6 HOURS
Refills: 0 | Status: COMPLETED | OUTPATIENT
Start: 2024-04-19 | End: 2025-03-18

## 2024-04-19 RX ORDER — MAGNESIUM HYDROXIDE 400 MG/1
30 TABLET, CHEWABLE ORAL
Refills: 0 | Status: DISCONTINUED | OUTPATIENT
Start: 2024-04-19 | End: 2024-04-25

## 2024-04-19 RX ORDER — TETANUS TOXOID, REDUCED DIPHTHERIA TOXOID AND ACELLULAR PERTUSSIS VACCINE, ADSORBED 5; 2.5; 8; 8; 2.5 [IU]/.5ML; [IU]/.5ML; UG/.5ML; UG/.5ML; UG/.5ML
0.5 SUSPENSION INTRAMUSCULAR ONCE
Refills: 0 | Status: DISCONTINUED | OUTPATIENT
Start: 2024-04-19 | End: 2024-04-25

## 2024-04-19 RX ORDER — OXYCODONE HYDROCHLORIDE 5 MG/1
5 TABLET ORAL
Refills: 0 | Status: DISCONTINUED | OUTPATIENT
Start: 2024-04-19 | End: 2024-04-19

## 2024-04-19 RX ORDER — CITRIC ACID/SODIUM CITRATE 300-500 MG
15 SOLUTION, ORAL ORAL EVERY 6 HOURS
Refills: 0 | Status: DISCONTINUED | OUTPATIENT
Start: 2024-04-19 | End: 2024-04-19

## 2024-04-19 RX ORDER — SODIUM CHLORIDE 9 MG/ML
1000 INJECTION, SOLUTION INTRAVENOUS
Refills: 0 | Status: DISCONTINUED | OUTPATIENT
Start: 2024-04-19 | End: 2024-04-25

## 2024-04-19 RX ORDER — METOCLOPRAMIDE HCL 10 MG
10 TABLET ORAL ONCE
Refills: 0 | Status: COMPLETED | OUTPATIENT
Start: 2024-04-19 | End: 2024-04-19

## 2024-04-19 RX ORDER — OXYTOCIN 10 UNIT/ML
333.33 VIAL (ML) INJECTION
Qty: 20 | Refills: 0 | Status: DISCONTINUED | OUTPATIENT
Start: 2024-04-19 | End: 2024-04-19

## 2024-04-19 RX ORDER — BUTORPHANOL TARTRATE 2 MG/ML
0.25 INJECTION, SOLUTION INTRAMUSCULAR; INTRAVENOUS EVERY 6 HOURS
Refills: 0 | Status: DISCONTINUED | OUTPATIENT
Start: 2024-04-19 | End: 2024-04-19

## 2024-04-19 RX ORDER — HEPARIN SODIUM 5000 [USP'U]/ML
5000 INJECTION INTRAVENOUS; SUBCUTANEOUS EVERY 12 HOURS
Refills: 0 | Status: DISCONTINUED | OUTPATIENT
Start: 2024-04-20 | End: 2024-04-25

## 2024-04-19 RX ORDER — LABETALOL HCL 100 MG
200 TABLET ORAL
Refills: 0 | Status: DISCONTINUED | OUTPATIENT
Start: 2024-04-19 | End: 2024-04-19

## 2024-04-19 RX ORDER — DIPHENHYDRAMINE HCL 50 MG
25 CAPSULE ORAL EVERY 4 HOURS
Refills: 0 | Status: DISCONTINUED | OUTPATIENT
Start: 2024-04-19 | End: 2024-04-25

## 2024-04-19 RX ORDER — SIMETHICONE 80 MG/1
80 TABLET, CHEWABLE ORAL EVERY 4 HOURS
Refills: 0 | Status: DISCONTINUED | OUTPATIENT
Start: 2024-04-19 | End: 2024-04-25

## 2024-04-19 RX ORDER — MORPHINE SULFATE 50 MG/1
0.1 CAPSULE, EXTENDED RELEASE ORAL ONCE
Refills: 0 | Status: DISCONTINUED | OUTPATIENT
Start: 2024-04-19 | End: 2024-04-20

## 2024-04-19 RX ORDER — KETOROLAC TROMETHAMINE 30 MG/ML
30 SYRINGE (ML) INJECTION EVERY 6 HOURS
Refills: 0 | Status: DISCONTINUED | OUTPATIENT
Start: 2024-04-19 | End: 2024-04-20

## 2024-04-19 RX ORDER — NALOXONE HYDROCHLORIDE 4 MG/.1ML
0.1 SPRAY NASAL
Refills: 0 | Status: DISCONTINUED | OUTPATIENT
Start: 2024-04-19 | End: 2024-04-25

## 2024-04-19 RX ORDER — CHLORHEXIDINE GLUCONATE 213 G/1000ML
1 SOLUTION TOPICAL DAILY
Refills: 0 | Status: DISCONTINUED | OUTPATIENT
Start: 2024-04-19 | End: 2024-04-19

## 2024-04-19 RX ORDER — LABETALOL HCL 100 MG
200 TABLET ORAL
Refills: 0 | Status: DISCONTINUED | OUTPATIENT
Start: 2024-04-19 | End: 2024-04-22

## 2024-04-19 RX ORDER — MAGNESIUM SULFATE 500 MG/ML
2 VIAL (ML) INJECTION
Qty: 40 | Refills: 0 | Status: DISCONTINUED | OUTPATIENT
Start: 2024-04-19 | End: 2024-04-19

## 2024-04-19 RX ORDER — ACETAMINOPHEN 500 MG
1000 TABLET ORAL ONCE
Refills: 0 | Status: COMPLETED | OUTPATIENT
Start: 2024-04-19 | End: 2024-04-19

## 2024-04-19 RX ORDER — DEXAMETHASONE 0.5 MG/5ML
4 ELIXIR ORAL EVERY 6 HOURS
Refills: 0 | Status: DISCONTINUED | OUTPATIENT
Start: 2024-04-19 | End: 2024-04-25

## 2024-04-19 RX ORDER — DIPHENHYDRAMINE HCL 50 MG
25 CAPSULE ORAL EVERY 6 HOURS
Refills: 0 | Status: DISCONTINUED | OUTPATIENT
Start: 2024-04-19 | End: 2024-04-25

## 2024-04-19 RX ORDER — FAMOTIDINE 10 MG/ML
20 INJECTION INTRAVENOUS ONCE
Refills: 0 | Status: COMPLETED | OUTPATIENT
Start: 2024-04-19 | End: 2024-04-19

## 2024-04-19 RX ORDER — OXYCODONE HYDROCHLORIDE 5 MG/1
10 TABLET ORAL
Refills: 0 | Status: DISCONTINUED | OUTPATIENT
Start: 2024-04-19 | End: 2024-04-19

## 2024-04-19 RX ORDER — NALBUPHINE HYDROCHLORIDE 10 MG/ML
2.5 INJECTION, SOLUTION INTRAMUSCULAR; INTRAVENOUS; SUBCUTANEOUS EVERY 6 HOURS
Refills: 0 | Status: DISCONTINUED | OUTPATIENT
Start: 2024-04-19 | End: 2024-04-25

## 2024-04-19 RX ORDER — OXYTOCIN 10 UNIT/ML
16.67 VIAL (ML) INJECTION
Qty: 20 | Refills: 0 | Status: DISCONTINUED | OUTPATIENT
Start: 2024-04-19 | End: 2024-04-25

## 2024-04-19 RX ORDER — DIPHENHYDRAMINE HCL 50 MG
25 CAPSULE ORAL ONCE
Refills: 0 | Status: COMPLETED | OUTPATIENT
Start: 2024-04-19 | End: 2024-04-19

## 2024-04-19 RX ORDER — LANOLIN
1 OINTMENT (GRAM) TOPICAL EVERY 6 HOURS
Refills: 0 | Status: DISCONTINUED | OUTPATIENT
Start: 2024-04-19 | End: 2024-04-25

## 2024-04-19 RX ORDER — ACETAMINOPHEN 500 MG
975 TABLET ORAL
Refills: 0 | Status: DISCONTINUED | OUTPATIENT
Start: 2024-04-19 | End: 2024-04-25

## 2024-04-19 RX ADMIN — Medication 15 MILLILITER(S): at 15:36

## 2024-04-19 RX ADMIN — FAMOTIDINE 20 MILLIGRAM(S): 10 INJECTION INTRAVENOUS at 15:36

## 2024-04-19 RX ADMIN — Medication 400 MILLIGRAM(S): at 12:03

## 2024-04-19 RX ADMIN — NALBUPHINE HYDROCHLORIDE 2.5 MILLIGRAM(S): 10 INJECTION, SOLUTION INTRAMUSCULAR; INTRAVENOUS; SUBCUTANEOUS at 20:26

## 2024-04-19 RX ADMIN — Medication 200 MILLIGRAM(S): at 12:24

## 2024-04-19 RX ADMIN — Medication 300 GRAM(S): at 12:02

## 2024-04-19 RX ADMIN — Medication 25 MILLIGRAM(S): at 14:04

## 2024-04-19 RX ADMIN — SODIUM CHLORIDE 125 MILLILITER(S): 9 INJECTION, SOLUTION INTRAVENOUS at 11:53

## 2024-04-19 RX ADMIN — Medication 50 GM/HR: at 12:24

## 2024-04-19 RX ADMIN — Medication 1000 MILLIGRAM(S): at 14:20

## 2024-04-19 RX ADMIN — Medication 975 MILLIGRAM(S): at 20:30

## 2024-04-19 RX ADMIN — Medication 10 MILLIGRAM(S): at 14:04

## 2024-04-19 RX ADMIN — NALBUPHINE HYDROCHLORIDE 2.5 MILLIGRAM(S): 10 INJECTION, SOLUTION INTRAMUSCULAR; INTRAVENOUS; SUBCUTANEOUS at 20:00

## 2024-04-19 NOTE — OB PROVIDER DELIVERY SUMMARY - NSCSREASONA_OBGYN_ALL_OB
Post-Care Instructions: I reviewed with the patient in detail post-care instructions. Patient is to wear sunprotection, and avoid picking at any of the treated lesions. Pt may apply Vaseline to crusted or scabbing areas. Number Of Freeze-Thaw Cycles: 3 freeze-thaw cycles Show Applicator Variable?: Yes Detail Level: Simple Duration Of Freeze Thaw-Cycle (Seconds): 2 Application Tool (Optional): Cry-AC Render Note In Bullet Format When Appropriate: No Consent: The patient's consent was obtained including but not limited to risks of crusting, scabbing, blistering, scarring, darker or lighter pigmentary change, recurrence, incomplete removal and infection. Number Of Freeze-Thaw Cycles: 1 freeze-thaw cycle Medical Necessity Clause: This procedure was medically necessary because the lesions that were treated were: Detail Level: Detailed Spray Paint Text: The liquid nitrogen was applied to the skin utilizing a spray paint frosting technique. Medical Necessity Information: It is in your best interest to select a reason for this procedure from the list below. All of these items fulfill various CMS LCD requirements except the new and changing color options. Duration Of Freeze Thaw-Cycle (Seconds): 5-10 Change in maternal status

## 2024-04-19 NOTE — OB RN INTRAOPERATIVE NOTE - NSSELHIDDEN_OBGYN_ALL_OB_FT
[NS_DeliveryAttending1_OBGYN_ALL_OB_FT:[id number]==],[NS_DeliveryAssist1_OBGYN_ALL_OB_FT:[email or web address]=],[NS_DeliveryRN_OBGYN_ALL_OB_FT:[id number]]

## 2024-04-19 NOTE — OB PROVIDER H&P - NS PANP OPT1 GEN_ALL_CORE
JET sent to scan stat for [facility] Sherie Wolfe./Rain Larson, [address] [place] [address]  [phone] I independently performed the documented history, exam, and medical decision making.

## 2024-04-19 NOTE — OB PROVIDER H&P - NS PANP COMMENT GEN_ALL_CORE FT
Ob Attg Note  I agree w admission note, as above.  pt meets criteria for severe preeclampsia.  she needs to be delivered today.  she will have r cs.  she would like BS to be done as well.    MgSO4 started, to continue till 24 hr pp  labetalol po for bp control.  titrate as needed pp.  pt and her partner were counseled and all q's answered.  indications for early delivery at 36w6d, r/b/a's discussed.  consents obtained.

## 2024-04-19 NOTE — OB RN DELIVERY SUMMARY - NS_SEPSISRSKCALC_OBGYN_ALL_OB_FT
EOS calculated successfully. EOS Risk Factor: 0.5/1000 live births ([facility] national incidence); GA=36w6d; Temp=98.4; ROM=0; GBS='Positive'; Antibiotics='No antibiotics or any antibiotics < 2 hrs prior to birth'

## 2024-04-19 NOTE — OB PROVIDER DELIVERY SUMMARY - NSPROVIDERDELIVERYNOTE_OBGYN_ALL_OB_FT
unscheduled rLTCS for sPEC with h/o prior c/s desiring permanent sterilization  Viable male infant, apgars 8/9, weight 6lbs 11 oz, vtx  Hysterotomy closed in 1 layer using Vicryl  Grossly normal uterus, tubes, and ovaries  Bilateral salpingectomy performed  Abdomen closed in standard fashion  Pt and infant to recovery in stable condition  Private cord and tissue collection  QBL: 415  IVF: 1850    UOP: 375    Resident unavailable to scrub so PA 1st assist  Dictation# unscheduled rLTCS for sPEC with h/o prior c/s desiring permanent sterilization  Viable male infant, apgars 8/9, weight 6lbs 11 oz, vtx  Hysterotomy closed in 1 layer using Vicryl  Grossly normal uterus, tubes, and ovaries  Bilateral salpingectomy performed - bilateral fallopian tubes removed with fide and chromic free ties.  Good hemostasis obtained  Abdomen closed in standard fashion  Pt and infant to recovery in stable condition  Private cord and tissue collection  QBL: 415  IVF: 1850    UOP: 375  Bilateral fallopian tubes to pathology    Resident unavailable to scrub so PA 1st assist  Dictation# [id number]

## 2024-04-19 NOTE — OB NEONATOLOGY/PEDIATRICIAN DELIVERY SUMMARY - NSMECDELIVBABYA_OBGYN_ALL_OB
Care Transitions Outreach Attempt      Patient: Kwesi Pham Patient : 1949 MRN: [id number]  Reason for Admission: Decompensated Alcohol Cirrhosis, Hepatic Encephalopathy  Discharge Date: 2/15/23       RARS: Readmission Risk Score: 33.8  Facility: [facility]  Last Discharge [address]       Date Complaint Diagnosis Description Type Department Provider    2/10/23 Altered Mental Status Increased ammonia level . .. ED to Hosp-Admission (Discharged) (ADMITTED) Dina Forbes MD; Marge Hurst. .. Noted following upcoming appointments from discharge chart review:   [facility] follow up appointment(s):   Future Appointments   Date Time Provider Lucita Murphy   3/8/2023  9:00 AM Espinoza Sabillon MD [facility] ID MMA   3/8/2023  1:40 PM MOHINDER Gonzalez - CNP formerly [facility] Heart MMA   3/14/2023 10:00 AM [facility] IR ROOM 1 [id number] [facility] Radiolo   3/21/2023 10:30 AM [facility] IR ROOM 1 SRMZ SPPROC [facility] Radiolo   2023 11:30 AM SPECIALS ROOM [address] [facility] Radiolo   2023 11:30 AM Yanira Bailey MD [facility] Gastro MMA   2023  2:45 PM Milvia Hemphill MD [facility] FPS MMA     Spoke very briefly w/ Wife who is not home w/ Patient. Reports Patient doing very well s/p paracentesis today. Noted to have total of 8820cc removed. Next session scheduled 3/14/23. Will attempt to reach Patient at later date.  [facility], [facility] [phone] no

## 2024-04-19 NOTE — OB PROVIDER H&P - NSLOWPPHRISK_OBGYN_A_OB
Hilario Pregnancy/Less than or equal to 4 previous vaginal births/No known bleeding disorder/No history of postpartum hemorrhage

## 2024-04-19 NOTE — OB NEONATOLOGY/PEDIATRICIAN DELIVERY SUMMARY - NSPEDSNEONOTESA_OBGYN_ALL_OB_FT
Called by OB team to attend unscheduled CS delivery due to Preeclampsia and magnesium. Baby is  product of a 36+6 week gestation born to a  33 year old female   Maternal labs include Blood Type  O positive, HIV/RPR/Hep B non reactive and immune, GBS positive (). Maternal history is significant for TOPx1, h/o C/s (), migraines. Pregnancy was complicated by  preeclampsia, required magnesium  AROM at delivery. Baby emerged vertex, vigorous, brought to the warmer, stimulated, suctioned. Required CPAP at 7 MOL in view of grunting and desaturations ( max 5/30 %), and wasn't able to wean.   Apgars were: 8, and 9 at 1 and 5 mol respectively. EOS score 0.11. Admit to NICU for respiratory distress.  Physical exam unremarkable. Called by OB team to attend unscheduled repeat CS delivery due to Preeclampsia and magnesium. Baby is  product of a 36+6 week gestation born to a  35 year old female   Maternal labs include Blood Type  O positive, HIV/RPR/Hep B non reactive and immune, GBS positive (). Maternal history is significant for TOPx1, h/o C/s (), migraines. Pregnancy was complicated by  preeclampsia, required magnesium  AROM at delivery. Baby emerged vertex, vigorous, brought to the warmer, stimulated, suctioned. Required CPAP at 7 MOL in view of grunting and desaturations ( max 5/30 %), and wasn't able to wean.   Apgars were: 8, and 9 at 1 and 5 mol respectively. EOS score 0.11. Admit to NICU for respiratory distress.  Physical exam unremarkable.

## 2024-04-19 NOTE — OB PROVIDER DELIVERY SUMMARY - NSSELHIDDEN_OBGYN_ALL_OB_FT
[NS_[email or web address]_ALL_OB_FT:[id number]],[NS_DeliveryAssist1_OBGYN_ALL_OB_FT:[id number]],[NS_DeliveryRN_OBGYN_ALL_OB_FT:[id number]]

## 2024-04-19 NOTE — OB RN DELIVERY SUMMARY - NSSELHIDDEN_OBGYN_ALL_OB_FT
[NS_DeliveryAttending1_OBGYN_ALL_OB_FT:[id number]],[NS_DeliveryAssist1_OBGYN_ALL_OB_FT:[id number]=],[NS_DeliveryRN_OBGYN_ALL_OB_FT:[id number]=]

## 2024-04-19 NOTE — OB PROVIDER H&P - HISTORY OF PRESENT ILLNESS
34yo     @  36w 6 d     presents c/o HA and elevated BP this am  Pt states her HA started this am and is 4/10, pt hasnt taken any medication for her HA  Pt states she first started having elevated BP last week and this morning her BP was 160/100  Denies contractions, VB or LOF has + FM  Denies epigastric pain or visual changes      PNC: Dr Boyle  PNI: sPEC   PNL: GBS positive    All: penicillin (Hives)  hydrocodone (Hives; Rash)  Meds: ASA, PNV, Magnesium, Probiotics  PMHx: Migraines, IBS, gastritic, GERD  PSHx: [place] teeth, C/S  Socialhx: Denies x 3, Denies anxiety or depression  OBhx: 2021  FT  c/s  c/b sPEC/Mg, required labetalol postpartum, 6lbs 2 oz  1st tri SAB--> no D & C  GYNhx: h/o HPV - last pap normal, denies fibroids, ov cysts or STDs    T(C): --  HR: 82 (04-19-24 @ 12:05) (78 - 101)  BP: 126/92 (04-19-24 @ 12:04) (126/92 - 163/100)  SpO2: 97% (04-19-24 @ 12:05) (97% - 99%)    Gen: NAD  Heart: RRR  Lungs: CTA B/L  Abdomen: Gravid, NT  Ext: no calf tenderness    NST: 125 moderate variability + accels no decels  TOCO: none  VE: deferred  EFW: 3000

## 2024-04-19 NOTE — OB PROVIDER H&P - PROBLEM SELECTOR PLAN 1
- Admit to L & D/labs/IVF/NPO  - Fetal status - NST/TOCO  - GBS positive  - Pre-op meds  - Pre-op labs  - Nursing pre-op  - Anesthesia pre-op  - Maternal status:  Pt meets criteria for sPEC based on elevated in severe range this am and again 4 hours later in triage with new onset HA.  HELLP labs ordered, Magnesium started. Serial BP's. Monitor I's and O's.  Will start labetalol 200mg BID.  - Pt given IV tylenol for HA  - Consents to be signed  D/W  Dr Montana Powell PA-C

## 2024-04-20 LAB
ALBUMIN SERPL ELPH-MCNC: 3.1 G/DL — LOW (ref 3.3–5)
ALP SERPL-CCNC: 197 U/L — HIGH (ref 40–120)
ALT FLD-CCNC: 9 U/L — LOW (ref 10–45)
ANION GAP SERPL CALC-SCNC: 10 MMOL/L — SIGNIFICANT CHANGE UP (ref 5–17)
AST SERPL-CCNC: 15 U/L — SIGNIFICANT CHANGE UP (ref 10–40)
BASOPHILS # BLD AUTO: 0.03 K/UL — SIGNIFICANT CHANGE UP (ref 0–0.2)
BASOPHILS NFR BLD AUTO: 0.3 % — SIGNIFICANT CHANGE UP (ref 0–2)
BILIRUB SERPL-MCNC: 0.1 MG/DL — LOW (ref 0.2–1.2)
BUN SERPL-MCNC: 5 MG/DL — LOW (ref 7–23)
CALCIUM SERPL-MCNC: 7.2 MG/DL — LOW (ref 8.4–10.5)
CHLORIDE SERPL-SCNC: 102 MMOL/L — SIGNIFICANT CHANGE UP (ref 96–108)
CO2 SERPL-SCNC: 21 MMOL/L — LOW (ref 22–31)
CREAT SERPL-MCNC: 0.57 MG/DL — SIGNIFICANT CHANGE UP (ref 0.5–1.3)
EGFR: 121 ML/MIN/1.73M2 — SIGNIFICANT CHANGE UP
EOSINOPHIL # BLD AUTO: 0 K/UL — SIGNIFICANT CHANGE UP (ref 0–0.5)
EOSINOPHIL NFR BLD AUTO: 0 % — SIGNIFICANT CHANGE UP (ref 0–6)
GLUCOSE SERPL-MCNC: 117 MG/DL — HIGH (ref 70–99)
HCT VFR BLD CALC: 31.4 % — LOW (ref 34.5–45)
HGB BLD-MCNC: 10.1 G/DL — LOW (ref 11.5–15.5)
IMM GRANULOCYTES NFR BLD AUTO: 0.9 % — SIGNIFICANT CHANGE UP (ref 0–0.9)
LDH SERPL L TO P-CCNC: 255 U/L — HIGH (ref 50–242)
LYMPHOCYTES # BLD AUTO: 1.1 K/UL — SIGNIFICANT CHANGE UP (ref 1–3.3)
LYMPHOCYTES # BLD AUTO: 10.2 % — LOW (ref 13–44)
MAGNESIUM SERPL-MCNC: 5.3 MG/DL — HIGH (ref 1.6–2.6)
MAGNESIUM SERPL-MCNC: 5.8 MG/DL — HIGH (ref 1.6–2.6)
MAGNESIUM SERPL-MCNC: 6 MG/DL — HIGH (ref 1.6–2.6)
MCHC RBC-ENTMCNC: 25.4 PG — LOW (ref 27–34)
MCHC RBC-ENTMCNC: 32.2 GM/DL — SIGNIFICANT CHANGE UP (ref 32–36)
MCV RBC AUTO: 79.1 FL — LOW (ref 80–100)
MONOCYTES # BLD AUTO: 0.63 K/UL — SIGNIFICANT CHANGE UP (ref 0–0.9)
MONOCYTES NFR BLD AUTO: 5.8 % — SIGNIFICANT CHANGE UP (ref 2–14)
NEUTROPHILS # BLD AUTO: 8.97 K/UL — HIGH (ref 1.8–7.4)
NEUTROPHILS NFR BLD AUTO: 82.8 % — HIGH (ref 43–77)
NRBC # BLD: 0 /100 WBCS — SIGNIFICANT CHANGE UP (ref 0–0)
PLATELET # BLD AUTO: 211 K/UL — SIGNIFICANT CHANGE UP (ref 150–400)
POTASSIUM SERPL-MCNC: 4.1 MMOL/L — SIGNIFICANT CHANGE UP (ref 3.5–5.3)
POTASSIUM SERPL-SCNC: 4.1 MMOL/L — SIGNIFICANT CHANGE UP (ref 3.5–5.3)
PROT SERPL-MCNC: 5.5 G/DL — LOW (ref 6–8.3)
RBC # BLD: 3.97 M/UL — SIGNIFICANT CHANGE UP (ref 3.8–5.2)
RBC # FLD: 11.9 % — SIGNIFICANT CHANGE UP (ref 10.3–14.5)
SODIUM SERPL-SCNC: 133 MMOL/L — LOW (ref 135–145)
URATE SERPL-MCNC: 4 MG/DL — SIGNIFICANT CHANGE UP (ref 2.5–7)
WBC # BLD: 10.83 K/UL — HIGH (ref 3.8–10.5)
WBC # FLD AUTO: 10.83 K/UL — HIGH (ref 3.8–10.5)

## 2024-04-20 RX ORDER — MAGNESIUM SULFATE 500 MG/ML
2 VIAL (ML) INJECTION
Qty: 40 | Refills: 0 | Status: DISCONTINUED | OUTPATIENT
Start: 2024-04-20 | End: 2024-04-20

## 2024-04-20 RX ORDER — IBUPROFEN 200 MG
600 TABLET ORAL EVERY 6 HOURS
Refills: 0 | Status: DISCONTINUED | OUTPATIENT
Start: 2024-04-20 | End: 2024-04-24

## 2024-04-20 RX ADMIN — Medication 200 MILLIGRAM(S): at 17:27

## 2024-04-20 RX ADMIN — Medication 200 MILLIGRAM(S): at 05:44

## 2024-04-20 RX ADMIN — Medication 975 MILLIGRAM(S): at 08:51

## 2024-04-20 RX ADMIN — Medication 30 MILLIGRAM(S): at 01:03

## 2024-04-20 RX ADMIN — Medication 975 MILLIGRAM(S): at 15:25

## 2024-04-20 RX ADMIN — Medication 975 MILLIGRAM(S): at 14:27

## 2024-04-20 RX ADMIN — Medication 30 MILLIGRAM(S): at 06:58

## 2024-04-20 RX ADMIN — Medication 30 MILLIGRAM(S): at 00:23

## 2024-04-20 RX ADMIN — Medication 30 MILLIGRAM(S): at 12:30

## 2024-04-20 RX ADMIN — HEPARIN SODIUM 5000 UNIT(S): 5000 INJECTION INTRAVENOUS; SUBCUTANEOUS at 17:28

## 2024-04-20 RX ADMIN — HEPARIN SODIUM 5000 UNIT(S): 5000 INJECTION INTRAVENOUS; SUBCUTANEOUS at 05:43

## 2024-04-20 RX ADMIN — Medication 600 MILLIGRAM(S): at 23:45

## 2024-04-20 RX ADMIN — SIMETHICONE 80 MILLIGRAM(S): 80 TABLET, CHEWABLE ORAL at 11:35

## 2024-04-20 RX ADMIN — Medication 30 MILLIGRAM(S): at 05:43

## 2024-04-20 RX ADMIN — Medication 25 MILLIGRAM(S): at 04:35

## 2024-04-20 RX ADMIN — Medication 30 MILLIGRAM(S): at 11:34

## 2024-04-20 RX ADMIN — Medication 975 MILLIGRAM(S): at 20:33

## 2024-04-20 RX ADMIN — Medication 30 MILLIGRAM(S): at 17:27

## 2024-04-20 RX ADMIN — Medication 975 MILLIGRAM(S): at 09:30

## 2024-04-20 RX ADMIN — Medication 975 MILLIGRAM(S): at 02:26

## 2024-04-20 RX ADMIN — Medication 975 MILLIGRAM(S): at 03:42

## 2024-04-20 RX ADMIN — Medication 975 MILLIGRAM(S): at 21:13

## 2024-04-20 RX ADMIN — Medication 50 GM/HR: at 08:50

## 2024-04-20 NOTE — PROGRESS NOTE ADULT - ASSESSMENT
A/P: 32yo POD#1 s/p rLTCS+BS c/b sPEC/Mg.  Patient is stable and doing well post-operatively.      #sPEC  - Mg for seizure ppx  - Labetalol 200 BID for BP control  - AM HELLP labs  - Monitor BPs        #Maternal wellbeing  - Regular diet  - HSQ/SCDs for DVT ppx  - PNV/Iron/Colace/Folic acid  - Increase ambulation.  - Continue motrin, tylenol, oxycodone PRN for pain control.    - F/u AM CBC      Gracie Carrillo MD, PGY-3

## 2024-04-21 ENCOUNTER — TRANSCRIPTION ENCOUNTER (OUTPATIENT)
Age: 34
End: 2024-04-21

## 2024-04-21 RX ORDER — FERROUS SULFATE 325(65) MG
1 TABLET ORAL
Qty: 0 | Refills: 0 | DISCHARGE

## 2024-04-21 RX ORDER — ACETAMINOPHEN 500 MG
2 TABLET ORAL
Qty: 0 | Refills: 0 | DISCHARGE

## 2024-04-21 RX ORDER — SIMETHICONE 80 MG/1
1 TABLET, CHEWABLE ORAL
Qty: 0 | Refills: 0 | DISCHARGE
Start: 2024-04-21

## 2024-04-21 RX ORDER — DOCUSATE SODIUM 100 MG
1 CAPSULE ORAL
Qty: 0 | Refills: 0 | DISCHARGE

## 2024-04-21 RX ORDER — LABETALOL HCL 100 MG
1 TABLET ORAL
Qty: 120 | Refills: 0
Start: 2024-04-21 | End: 2024-06-19

## 2024-04-21 RX ORDER — ASPIRIN/CALCIUM CARB/MAGNESIUM 324 MG
1 TABLET ORAL
Refills: 0 | DISCHARGE

## 2024-04-21 RX ORDER — LABETALOL HCL 100 MG
1 TABLET ORAL
Qty: 0 | Refills: 0 | DISCHARGE
Start: 2024-04-21

## 2024-04-21 RX ADMIN — Medication 975 MILLIGRAM(S): at 09:13

## 2024-04-21 RX ADMIN — Medication 975 MILLIGRAM(S): at 03:51

## 2024-04-21 RX ADMIN — Medication 200 MILLIGRAM(S): at 05:39

## 2024-04-21 RX ADMIN — Medication 600 MILLIGRAM(S): at 00:14

## 2024-04-21 RX ADMIN — Medication 975 MILLIGRAM(S): at 15:05

## 2024-04-21 RX ADMIN — Medication 975 MILLIGRAM(S): at 04:22

## 2024-04-21 RX ADMIN — Medication 600 MILLIGRAM(S): at 23:04

## 2024-04-21 RX ADMIN — Medication 600 MILLIGRAM(S): at 17:38

## 2024-04-21 RX ADMIN — Medication 975 MILLIGRAM(S): at 20:34

## 2024-04-21 RX ADMIN — HEPARIN SODIUM 5000 UNIT(S): 5000 INJECTION INTRAVENOUS; SUBCUTANEOUS at 05:39

## 2024-04-21 RX ADMIN — Medication 975 MILLIGRAM(S): at 21:26

## 2024-04-21 RX ADMIN — HEPARIN SODIUM 5000 UNIT(S): 5000 INJECTION INTRAVENOUS; SUBCUTANEOUS at 17:38

## 2024-04-21 RX ADMIN — Medication 600 MILLIGRAM(S): at 12:30

## 2024-04-21 RX ADMIN — Medication 975 MILLIGRAM(S): at 16:00

## 2024-04-21 RX ADMIN — Medication 200 MILLIGRAM(S): at 17:38

## 2024-04-21 RX ADMIN — Medication 975 MILLIGRAM(S): at 10:04

## 2024-04-21 RX ADMIN — Medication 600 MILLIGRAM(S): at 06:22

## 2024-04-21 RX ADMIN — Medication 600 MILLIGRAM(S): at 05:39

## 2024-04-21 RX ADMIN — Medication 600 MILLIGRAM(S): at 18:42

## 2024-04-21 RX ADMIN — Medication 600 MILLIGRAM(S): at 11:32

## 2024-04-21 NOTE — DISCHARGE NOTE OB - MEDICATION SUMMARY - MEDICATIONS TO TAKE
I will START or STAY ON the medications listed below when I get home from the hospital:    ibuprofen 200 mg oral tablet  -- 3 tab(s) by mouth every 6 hours as needed for  moderate pain  -- Indication: For pain, cramps or fever    acetaminophen 500 mg oral tablet  -- 2 tab(s) by mouth every 6 hours as needed for  moderate pain  -- Indication: For pain, cramps or fever    labetalol 200 mg oral tablet  -- 1 tab(s) by mouth 2 times a day  -- Indication: For blood pressure    Prenatal Multivitamins oral tablet  -- 1 by mouth once a day  -- Indication: For vitamins    Slow Fe (as elemental iron) 45 mg oral tablet, extended release  -- 1 tab(s) by mouth once a day  -- Indication: For iron supplement    Colace 100 mg oral capsule  -- 1 cap(s) by mouth twice a day after breakfast and dinner as needed for  constipation  -- Indication: For constipation    simethicone 80 mg oral tablet, chewable  -- 1 tab(s) by mouth every 4 hours As needed Gas  -- Indication: For gas pain   I will START or STAY ON the medications listed below when I get home from the hospital:    acetaminophen 500 mg oral tablet  -- 2 tab(s) by mouth every 6 hours as needed for  moderate pain  -- Indication: For pain, cramps or fever    labetalol 200 mg oral tablet  -- 2 tab(s) by mouth every 8 hours Hold BP<110/60, HR<60  -- Indication: For Hypertension    Lasix 20 mg oral tablet  -- 1 tab(s) by mouth once a day  -- Indication: For Hypertension    Prenatal Multivitamins oral tablet  -- 1 by mouth once a day  -- Indication: For vitamins    Slow Fe (as elemental iron) 45 mg oral tablet, extended release  -- 1 tab(s) by mouth once a day  -- Indication: For iron supplement    Colace 100 mg oral capsule  -- 1 cap(s) by mouth twice a day after breakfast and dinner as needed for  constipation  -- Indication: For constipation    simethicone 80 mg oral tablet, chewable  -- 1 tab(s) by mouth every 4 hours As needed Gas  -- Indication: For gas pain    hydrALAZINE 10 mg oral tablet  -- 1 tab(s) by mouth 3 times a day Hold BP<110/60  -- Indication: For Hypertension

## 2024-04-21 NOTE — DISCHARGE NOTE OB - CARE PROVIDER_API CALL
Parish Boyle  Obstetrics and Gynecology  [address], [address]  [place] [address]  Phone: [phone]  Fax: [phone]  Follow Up Time:

## 2024-04-21 NOTE — DISCHARGE NOTE OB - MATERIALS PROVIDED
Vaccinations/Guide to Postpartum Care/[facility] Hearing Screen Program/Back To Sleep Handout/Shaken Baby Prevention Handout/Birth Certificate Instructions

## 2024-04-21 NOTE — DISCHARGE NOTE OB - CARE PLAN
Principal Discharge DX:	Single delivery by  section  Assessment and plan of treatment:	Patient is stable and doing well upon discharge from the hospital.  She has been counseled regarding postpartum care, modification of her activities and pain management.   She will be seen at outpatient ob/gyn office for postpartum check in 2 wks and again in a month after discharge, or sooner if needed.   1 Principal Discharge DX:	Single delivery by  section  Assessment and plan of treatment:	Patient is stable and doing well upon discharge from the hospital.  She has been counseled regarding postpartum care, modification of her activities and pain management.   She will be seen at outpatient ob/gyn office for postpartum check in 1 wk and again in a month after discharge, or sooner if needed.

## 2024-04-21 NOTE — DISCHARGE NOTE OB - PATIENT PORTAL LINK FT
You can access the FollowMyHealth Patient Portal offered by [facility] by registering at the following website: [email or web address]. By joining [employer]’s FollowMyHealth portal, you will also be able to view your health information using other applications (apps) compatible with our system.

## 2024-04-21 NOTE — DISCHARGE NOTE OB - HOSPITAL COURSE
Patient was admitted for delivery via repeat  at 36 weeks and 6 days, due to preeclampsia with features of severe disease.  She was delivered by  without any complications.  As per request, tubal sterilization (bilateral salpingectomies) also performed during .  Her postpartum course was uneventful.  Her BP's were maintained on Labetalol PO.  She reached appropriate postoperative milestones.  There was no excessive vaginal bleeding.  Her wound has been healing well.  There has not been any clinical signs or symptoms of postop infection.  She was discharged home on oral pain medications.  Discharge and follow up instructions discussed with patient, and all of her concerns were addressed prior to discharge.  Patient was admitted for delivery via repeat  at 36 weeks and 6 days, due to preeclampsia with features of severe disease.  She was delivered by  without any complications.  As per request, tubal sterilization (bilateral salpingectomies) also performed during .  Her postpartum course was uneventful.  Her BP's were maintained on Labetalol PO/HYdralazine and lasix.  She reached appropriate postoperative milestones.  There was no excessive vaginal bleeding.  Her wound has been healing well.  There has not been any clinical signs or symptoms of postop infection.  She was discharged home on oral pain medications.  Discharge and follow up instructions discussed with patient, and all of her concerns were addressed prior to discharge.

## 2024-04-21 NOTE — DISCHARGE NOTE OB - PLAN OF CARE
Patient is stable and doing well upon discharge from the hospital.  She has been counseled regarding postpartum care, modification of her activities and pain management.   She will be seen at outpatient ob/gyn office for postpartum check in 2 wks and again in a month after discharge, or sooner if needed. Patient is stable and doing well upon discharge from the hospital.  She has been counseled regarding postpartum care, modification of her activities and pain management.   She will be seen at outpatient ob/gyn office for postpartum check in 1 wk and again in a month after discharge, or sooner if needed.

## 2024-04-21 NOTE — PROGRESS NOTE ADULT - ASSESSMENT
A/P: 34yo POD#2 s/p rLTCS+BS c/b sPEC/Mg.  Patient is stable and doing well post-operatively.      #sPEC  - s/p Mg for seizure ppx  - c/w Labetalol 200 BID for BP control  - HELLP labs wnl   - Monitor BPs    #Maternal wellbeing  - Regular diet  - HSQ/SCDs for DVT ppx  - PNV  - Increase ambulation.  - Continue motrin, tylenol, oxycodone PRN for pain control.      Anayeli Oquendo, PGY3

## 2024-04-22 LAB
ALBUMIN SERPL ELPH-MCNC: 3.2 G/DL — LOW (ref 3.3–5)
ALP SERPL-CCNC: 160 U/L — HIGH (ref 40–120)
ALT FLD-CCNC: 11 U/L — SIGNIFICANT CHANGE UP (ref 10–45)
ANION GAP SERPL CALC-SCNC: 12 MMOL/L — SIGNIFICANT CHANGE UP (ref 5–17)
AST SERPL-CCNC: 16 U/L — SIGNIFICANT CHANGE UP (ref 10–40)
BILIRUB SERPL-MCNC: 0.1 MG/DL — LOW (ref 0.2–1.2)
BUN SERPL-MCNC: 8 MG/DL — SIGNIFICANT CHANGE UP (ref 7–23)
CALCIUM SERPL-MCNC: 8.7 MG/DL — SIGNIFICANT CHANGE UP (ref 8.4–10.5)
CHLORIDE SERPL-SCNC: 107 MMOL/L — SIGNIFICANT CHANGE UP (ref 96–108)
CO2 SERPL-SCNC: 21 MMOL/L — LOW (ref 22–31)
CREAT SERPL-MCNC: 0.58 MG/DL — SIGNIFICANT CHANGE UP (ref 0.5–1.3)
EGFR: 121 ML/MIN/1.73M2 — SIGNIFICANT CHANGE UP
GLUCOSE SERPL-MCNC: 71 MG/DL — SIGNIFICANT CHANGE UP (ref 70–99)
HCT VFR BLD CALC: 31.4 % — LOW (ref 34.5–45)
HGB BLD-MCNC: 9.9 G/DL — LOW (ref 11.5–15.5)
MCHC RBC-ENTMCNC: 25.7 PG — LOW (ref 27–34)
MCHC RBC-ENTMCNC: 31.5 GM/DL — LOW (ref 32–36)
MCV RBC AUTO: 81.6 FL — SIGNIFICANT CHANGE UP (ref 80–100)
NRBC # BLD: 0 /100 WBCS — SIGNIFICANT CHANGE UP (ref 0–0)
PLATELET # BLD AUTO: 249 K/UL — SIGNIFICANT CHANGE UP (ref 150–400)
POTASSIUM SERPL-MCNC: 4.3 MMOL/L — SIGNIFICANT CHANGE UP (ref 3.5–5.3)
POTASSIUM SERPL-SCNC: 4.3 MMOL/L — SIGNIFICANT CHANGE UP (ref 3.5–5.3)
PROT SERPL-MCNC: 6.1 G/DL — SIGNIFICANT CHANGE UP (ref 6–8.3)
RBC # BLD: 3.85 M/UL — SIGNIFICANT CHANGE UP (ref 3.8–5.2)
RBC # FLD: 12.4 % — SIGNIFICANT CHANGE UP (ref 10.3–14.5)
SODIUM SERPL-SCNC: 140 MMOL/L — SIGNIFICANT CHANGE UP (ref 135–145)
WBC # BLD: 5.9 K/UL — SIGNIFICANT CHANGE UP (ref 3.8–10.5)
WBC # FLD AUTO: 5.9 K/UL — SIGNIFICANT CHANGE UP (ref 3.8–10.5)

## 2024-04-22 RX ORDER — LABETALOL HCL 100 MG
300 TABLET ORAL THREE TIMES A DAY
Refills: 0 | Status: DISCONTINUED | OUTPATIENT
Start: 2024-04-22 | End: 2024-04-23

## 2024-04-22 RX ADMIN — Medication 975 MILLIGRAM(S): at 17:00

## 2024-04-22 RX ADMIN — Medication 975 MILLIGRAM(S): at 22:51

## 2024-04-22 RX ADMIN — Medication 975 MILLIGRAM(S): at 08:47

## 2024-04-22 RX ADMIN — Medication 975 MILLIGRAM(S): at 02:08

## 2024-04-22 RX ADMIN — Medication 600 MILLIGRAM(S): at 05:15

## 2024-04-22 RX ADMIN — Medication 600 MILLIGRAM(S): at 00:04

## 2024-04-22 RX ADMIN — HEPARIN SODIUM 5000 UNIT(S): 5000 INJECTION INTRAVENOUS; SUBCUTANEOUS at 18:16

## 2024-04-22 RX ADMIN — Medication 975 MILLIGRAM(S): at 03:08

## 2024-04-22 RX ADMIN — Medication 975 MILLIGRAM(S): at 09:30

## 2024-04-22 RX ADMIN — Medication 600 MILLIGRAM(S): at 13:00

## 2024-04-22 RX ADMIN — Medication 200 MILLIGRAM(S): at 05:15

## 2024-04-22 RX ADMIN — Medication 600 MILLIGRAM(S): at 12:15

## 2024-04-22 RX ADMIN — Medication 600 MILLIGRAM(S): at 06:15

## 2024-04-22 RX ADMIN — Medication 300 MILLIGRAM(S): at 14:21

## 2024-04-22 RX ADMIN — Medication 600 MILLIGRAM(S): at 23:44

## 2024-04-22 RX ADMIN — Medication 600 MILLIGRAM(S): at 18:17

## 2024-04-22 RX ADMIN — Medication 975 MILLIGRAM(S): at 21:37

## 2024-04-22 RX ADMIN — Medication 975 MILLIGRAM(S): at 16:14

## 2024-04-22 RX ADMIN — HEPARIN SODIUM 5000 UNIT(S): 5000 INJECTION INTRAVENOUS; SUBCUTANEOUS at 05:15

## 2024-04-22 RX ADMIN — Medication 300 MILLIGRAM(S): at 21:37

## 2024-04-23 ENCOUNTER — NON-APPOINTMENT (OUTPATIENT)
Age: 34
End: 2024-04-23

## 2024-04-23 PROCEDURE — 70450 CT HEAD/BRAIN W/O DYE: CPT | Mod: 26

## 2024-04-23 RX ORDER — DIPHENHYDRAMINE HCL 50 MG
25 CAPSULE ORAL EVERY 4 HOURS
Refills: 0 | Status: DISCONTINUED | OUTPATIENT
Start: 2024-04-23 | End: 2024-04-23

## 2024-04-23 RX ORDER — SUMATRIPTAN SUCCINATE 4 MG/.5ML
50 INJECTION, SOLUTION SUBCUTANEOUS EVERY 12 HOURS
Refills: 0 | Status: DISCONTINUED | OUTPATIENT
Start: 2024-04-23 | End: 2024-04-25

## 2024-04-23 RX ORDER — LABETALOL HCL 100 MG
400 TABLET ORAL EVERY 8 HOURS
Refills: 0 | Status: DISCONTINUED | OUTPATIENT
Start: 2024-04-23 | End: 2024-04-25

## 2024-04-23 RX ORDER — METOCLOPRAMIDE HCL 10 MG
10 TABLET ORAL ONCE
Refills: 0 | Status: COMPLETED | OUTPATIENT
Start: 2024-04-23 | End: 2024-04-23

## 2024-04-23 RX ORDER — DIPHENHYDRAMINE HCL 50 MG
25 CAPSULE ORAL ONCE
Refills: 0 | Status: DISCONTINUED | OUTPATIENT
Start: 2024-04-23 | End: 2024-04-25

## 2024-04-23 RX ADMIN — Medication 975 MILLIGRAM(S): at 22:02

## 2024-04-23 RX ADMIN — Medication 600 MILLIGRAM(S): at 05:54

## 2024-04-23 RX ADMIN — HEPARIN SODIUM 5000 UNIT(S): 5000 INJECTION INTRAVENOUS; SUBCUTANEOUS at 18:56

## 2024-04-23 RX ADMIN — Medication 975 MILLIGRAM(S): at 03:21

## 2024-04-23 RX ADMIN — Medication 975 MILLIGRAM(S): at 21:33

## 2024-04-23 RX ADMIN — SUMATRIPTAN SUCCINATE 50 MILLIGRAM(S): 4 INJECTION, SOLUTION SUBCUTANEOUS at 14:13

## 2024-04-23 RX ADMIN — Medication 400 MILLIGRAM(S): at 21:33

## 2024-04-23 RX ADMIN — Medication 600 MILLIGRAM(S): at 00:24

## 2024-04-23 RX ADMIN — Medication 400 MILLIGRAM(S): at 05:58

## 2024-04-23 RX ADMIN — Medication 400 MILLIGRAM(S): at 13:41

## 2024-04-23 RX ADMIN — Medication 600 MILLIGRAM(S): at 18:56

## 2024-04-23 RX ADMIN — Medication 975 MILLIGRAM(S): at 09:45

## 2024-04-23 RX ADMIN — Medication 975 MILLIGRAM(S): at 15:27

## 2024-04-23 RX ADMIN — SUMATRIPTAN SUCCINATE 50 MILLIGRAM(S): 4 INJECTION, SOLUTION SUBCUTANEOUS at 15:37

## 2024-04-23 RX ADMIN — Medication 600 MILLIGRAM(S): at 14:15

## 2024-04-23 RX ADMIN — Medication 975 MILLIGRAM(S): at 04:21

## 2024-04-23 RX ADMIN — Medication 600 MILLIGRAM(S): at 13:40

## 2024-04-23 RX ADMIN — Medication 600 MILLIGRAM(S): at 06:31

## 2024-04-23 RX ADMIN — Medication 975 MILLIGRAM(S): at 09:31

## 2024-04-23 RX ADMIN — HEPARIN SODIUM 5000 UNIT(S): 5000 INJECTION INTRAVENOUS; SUBCUTANEOUS at 05:54

## 2024-04-23 NOTE — PROVIDER CONTACT NOTE (OTHER) - SITUATION
Complaint as above for two days as per pt.
Elevated GE=973/81
Elevated SK=094/99
Elevated PI=013/96 P=68

## 2024-04-23 NOTE — PROVIDER CONTACT NOTE (OTHER) - ACTION/TREATMENT ORDERED:
repeat BP in 30 minutes
Reassurance provided. Comfort measures continues. Awaiting further intervention orders. LJ
Repeat BP in 15 minutes
No intervention at this time

## 2024-04-23 NOTE — PROVIDER CONTACT NOTE (OTHER) - RECOMMENDATIONS
due Tylenol 975 mg and Labetalol 300 mg given, advised patient to report worsening headache, dizziness, visual changes and epigastric pain
Notify Mary Beth FRIEDMAN
advised to report any discomforts
Patient advised to report any discomforts, advised to be calm and deep breath  Due labetalol 400 mg given  scheduled for CT of head @ 2230

## 2024-04-23 NOTE — PROVIDER CONTACT NOTE (OTHER) - ASSESSMENT
Headaches, /92  Denies blurred vision; nausea/or vomiting.
patient denies headache, dizziness, visual changes, epigastric pain  s/p CT of the head
patient denies headache, dizziness, visual changes, epigastric pain
patient verbalized having headache with pain rate of 5/10

## 2024-04-23 NOTE — PROGRESS NOTE ADULT - ASSESSMENT
A/P: 32yo POD#4 s/p rLTCS+BS c/b sPEC/Mg.  Patient is stable and doing well post-operatively.      #sPEC  - s/p Mg for seizure ppx  - c/w Labetalol 300 TID for BP control  - HELLP labs wnl   - Monitor BPs, BPs elevated overnight   - HA resolved with Tylenol overnight, denies other severe features.     #Maternal wellbeing  - Regular diet  - HSQ/SCDs for DVT ppx  - PNV  - Increase ambulation.  - Continue motrin, tylenol, oxycodone PRN for pain control.      Gracie Carrillo MD, PGY-3  A/P: 34yo POD#4 s/p rLTCS+BS c/b sPEC/Mg.  Patient is stable and doing well post-operatively.      #sPEC  - s/p Mg for seizure ppx  - c/w Labetalol 300 TID for BP control  - HELLP labs wnl; Lab Holiday today  - Monitor BPs, BPs elevated overnight   - HA resolved with Tylenol overnight, denies other severe features.     #Maternal wellbeing  - Regular diet  - HSQ/SCDs for DVT ppx  - PNV  - Increase ambulation.  - Continue motrin, tylenol, oxycodone PRN for pain control.      Gracie Carrillo MD, PGY-3

## 2024-04-23 NOTE — PROVIDER CONTACT NOTE (OTHER) - BACKGROUND
a/w PEC, elevated BP  C/S at 36.6 wks
s/p CS day 3, s/p Mg for PEC  on Labetalol 300 mg q 8 hours
s/p CS day 4, s/p Mg for PEC  on Labetalol 400 mg q 8 hours
s/p CS day 4, s/p Mg for PEC   on Labetalol 400 mg q 8 hours

## 2024-04-24 ENCOUNTER — RESULT REVIEW (OUTPATIENT)
Age: 34
End: 2024-04-24

## 2024-04-24 LAB
ALBUMIN SERPL ELPH-MCNC: 3.5 G/DL — SIGNIFICANT CHANGE UP (ref 3.3–5)
ALP SERPL-CCNC: 149 U/L — HIGH (ref 40–120)
ALT FLD-CCNC: 38 U/L — SIGNIFICANT CHANGE UP (ref 10–45)
ANION GAP SERPL CALC-SCNC: 14 MMOL/L — SIGNIFICANT CHANGE UP (ref 5–17)
AST SERPL-CCNC: 21 U/L — SIGNIFICANT CHANGE UP (ref 10–40)
BILIRUB SERPL-MCNC: 0.2 MG/DL — SIGNIFICANT CHANGE UP (ref 0.2–1.2)
BUN SERPL-MCNC: 11 MG/DL — SIGNIFICANT CHANGE UP (ref 7–23)
CALCIUM SERPL-MCNC: 8.9 MG/DL — SIGNIFICANT CHANGE UP (ref 8.4–10.5)
CHLORIDE SERPL-SCNC: 103 MMOL/L — SIGNIFICANT CHANGE UP (ref 96–108)
CO2 SERPL-SCNC: 22 MMOL/L — SIGNIFICANT CHANGE UP (ref 22–31)
CREAT SERPL-MCNC: 0.61 MG/DL — SIGNIFICANT CHANGE UP (ref 0.5–1.3)
EGFR: 119 ML/MIN/1.73M2 — SIGNIFICANT CHANGE UP
GLUCOSE SERPL-MCNC: 76 MG/DL — SIGNIFICANT CHANGE UP (ref 70–99)
HCT VFR BLD CALC: 32.4 % — LOW (ref 34.5–45)
HGB BLD-MCNC: 9.9 G/DL — LOW (ref 11.5–15.5)
MCHC RBC-ENTMCNC: 25.1 PG — LOW (ref 27–34)
MCHC RBC-ENTMCNC: 30.6 GM/DL — LOW (ref 32–36)
MCV RBC AUTO: 82.2 FL — SIGNIFICANT CHANGE UP (ref 80–100)
NRBC # BLD: 0 /100 WBCS — SIGNIFICANT CHANGE UP (ref 0–0)
PLATELET # BLD AUTO: 288 K/UL — SIGNIFICANT CHANGE UP (ref 150–400)
POTASSIUM SERPL-MCNC: 4.3 MMOL/L — SIGNIFICANT CHANGE UP (ref 3.5–5.3)
POTASSIUM SERPL-SCNC: 4.3 MMOL/L — SIGNIFICANT CHANGE UP (ref 3.5–5.3)
PROT SERPL-MCNC: 6.2 G/DL — SIGNIFICANT CHANGE UP (ref 6–8.3)
RBC # BLD: 3.94 M/UL — SIGNIFICANT CHANGE UP (ref 3.8–5.2)
RBC # FLD: 12.4 % — SIGNIFICANT CHANGE UP (ref 10.3–14.5)
SODIUM SERPL-SCNC: 139 MMOL/L — SIGNIFICANT CHANGE UP (ref 135–145)
WBC # BLD: 5.34 K/UL — SIGNIFICANT CHANGE UP (ref 3.8–10.5)
WBC # FLD AUTO: 5.34 K/UL — SIGNIFICANT CHANGE UP (ref 3.8–10.5)

## 2024-04-24 PROCEDURE — 93356 MYOCRD STRAIN IMG SPCKL TRCK: CPT

## 2024-04-24 PROCEDURE — 93306 TTE W/DOPPLER COMPLETE: CPT | Mod: 26

## 2024-04-24 PROCEDURE — 99222 1ST HOSP IP/OBS MODERATE 55: CPT

## 2024-04-24 RX ORDER — FUROSEMIDE 40 MG
20 TABLET ORAL EVERY 24 HOURS
Refills: 0 | Status: COMPLETED | OUTPATIENT
Start: 2024-04-24 | End: 2024-04-25

## 2024-04-24 RX ORDER — HYDRALAZINE HCL 50 MG
10 TABLET ORAL THREE TIMES A DAY
Refills: 0 | Status: DISCONTINUED | OUTPATIENT
Start: 2024-04-24 | End: 2024-04-25

## 2024-04-24 RX ADMIN — Medication 975 MILLIGRAM(S): at 02:50

## 2024-04-24 RX ADMIN — Medication 10 MILLIGRAM(S): at 00:57

## 2024-04-24 RX ADMIN — Medication 600 MILLIGRAM(S): at 05:54

## 2024-04-24 RX ADMIN — Medication 400 MILLIGRAM(S): at 14:03

## 2024-04-24 RX ADMIN — Medication 10 MILLIGRAM(S): at 13:59

## 2024-04-24 RX ADMIN — Medication 975 MILLIGRAM(S): at 20:29

## 2024-04-24 RX ADMIN — Medication 975 MILLIGRAM(S): at 19:29

## 2024-04-24 RX ADMIN — Medication 400 MILLIGRAM(S): at 21:36

## 2024-04-24 RX ADMIN — Medication 400 MILLIGRAM(S): at 05:57

## 2024-04-24 RX ADMIN — Medication 10 MILLIGRAM(S): at 05:54

## 2024-04-24 RX ADMIN — HEPARIN SODIUM 5000 UNIT(S): 5000 INJECTION INTRAVENOUS; SUBCUTANEOUS at 05:54

## 2024-04-24 RX ADMIN — Medication 600 MILLIGRAM(S): at 02:10

## 2024-04-24 RX ADMIN — HEPARIN SODIUM 5000 UNIT(S): 5000 INJECTION INTRAVENOUS; SUBCUTANEOUS at 20:12

## 2024-04-24 RX ADMIN — Medication 600 MILLIGRAM(S): at 06:31

## 2024-04-24 RX ADMIN — Medication 975 MILLIGRAM(S): at 03:32

## 2024-04-24 RX ADMIN — Medication 10 MILLIGRAM(S): at 21:36

## 2024-04-24 RX ADMIN — Medication 600 MILLIGRAM(S): at 00:00

## 2024-04-24 RX ADMIN — Medication 20 MILLIGRAM(S): at 15:54

## 2024-04-24 NOTE — PROGRESS NOTE ADULT - ASSESSMENT
A/P: 34yo POD#5 s/p rLTCS+BS c/b sPEC/Mg.  Patient is stable and doing well post-operatively.      #sPEC  - s/p Mg for seizure ppx  - c/w Labetalol 400 TID for BP control  - HELLP labs wnl; Lab Holiday today  - Monitor BPs, BPs elevated overnight Hydral 10mg TID added to regimen overnight. Will follow BPs for potential discharge later today.  -Consider Cardio OB consult  - HA resolved with Tylenol overnight, denies other severe features. f/u CT Head    #Maternal wellbeing  - Regular diet  - HSQ/SCDs for DVT ppx  - PNV  - Increase ambulation.  - Continue motrin, tylenol, oxycodone PRN for pain control.      Gracie Carrillo MD, PGY-3

## 2024-04-24 NOTE — CONSULT NOTE ADULT - SUBJECTIVE AND OBJECTIVE BOX
CHIEF COMPLAINT: Asked by OB team to evaluate patient with elevated BPs.     HISTORY OF PRESENT ILLNESS: Ms. Calabrese is a  32yo  s/p repeat  section POD # 5, post op course complicated by severe Preeclampsia. Patient received Magnesium sulphate gtt and remains stable at the present time. Denies chest pain, shortness of breath, dizziness, lightheadedness, palpitations or near syncope or syncope, orthopnea, PND and increasing lower extremity edema. Patient is currently on Labetalol 400 mg Q8H and overnight Hydralazine 10 mg TID was added to optimize BPs.     PAST MEDICAL & SURGICAL HISTORY:  Migraines  Pre-eclampsia  H/O:  section  [place] teeth removed    PREVIOUS DIAGNOSTIC TESTING:    [x ] Echocardiogram: Pending   [ ]  Catheterization:  [ ] Stress Test:  	    MEDICATIONS:  furosemide   Injectable 20 milliGRAM(s) IV Push every 24 hours  heparin   Injectable 5000 Unit(s) SubCutaneous every 12 hours  hydrALAZINE 10 milliGRAM(s) Oral three times a day  labetalol 400 milliGRAM(s) Oral every 8 hours  diphenhydrAMINE Injectable 25 milliGRAM(s) IV Push every 4 hours PRN  acetaminophen     Tablet .. 975 milliGRAM(s) Oral <User Schedule>  diphenhydrAMINE 25 milliGRAM(s) Oral every 6 hours PRN  diphenhydrAMINE 25 milliGRAM(s) Oral once  ibuprofen  Tablet. 600 milliGRAM(s) Oral every 6 hours  nalbuphine Injectable 2.5 milliGRAM(s) IV Push every 6 hours PRN  ondansetron Injectable 4 milliGRAM(s) IV Push every 6 hours PRN  SUMAtriptan 50 milliGRAM(s) Oral every 12 hours PRN  magnesium hydroxide Suspension 30 milliLiter(s) Oral two times a day PRN  metoclopramide 10 milliGRAM(s) Oral once  simethicone 80 milliGRAM(s) Chew every 4 hours PRN  dexAMETHasone  Injectable 4 milliGRAM(s) IV Push every 6 hours PRN  diphtheria/tetanus/pertussis (acellular) Vaccine (Adacel) 0.5 milliLiter(s) IntraMuscular once  lactated ringers. 1000 milliLiter(s) IV Continuous <Continuous>  lanolin Ointment 1 Application(s) Topical every 6 hours PRN  oxytocin Infusion 16.667 milliUNIT(s)/Min IV Continuous <Continuous>    FAMILY HISTORY:  Both Parents with HTN   SOCIAL HISTORY:    [x ] Non-smoker  [ ] Smoker  [ ] Alcohol    Allergies  penicillin (Hives)  hydrocodone (Hives; Rash)    Intolerances    REVIEW OF SYSTEMS:  CONSTITUTIONAL: No fever, weight loss, or fatigue  EYES: No eye pain, visual disturbances, or discharge  ENMT:  No difficulty hearing, tinnitus, vertigo; No sinus or throat pain  NECK: No pain or stiffness  RESPIRATORY: No cough, wheezing, chills or hemoptysis; No Shortness of Breath  CARDIOVASCULAR: No chest pain, palpitations, passing out, dizziness, Trace BL - LE edema   GASTROINTESTINAL: No abdominal or epigastric pain. No nausea, vomiting, or hematemesis; No diarrhea or constipation. No melena or hematochezia.  GENITOURINARY: No dysuria, frequency, hematuria, or incontinence  NEUROLOGICAL: No headaches, memory loss, loss of strength, numbness, or tremors  SKIN: No itching, burning, rashes, or lesions   LYMPH Nodes: No enlarged glands  ENDOCRINE: No heat or cold intolerance; No hair loss  MUSCULOSKELETAL: No joint pain or swelling; No muscle, back, or extremity pain  PSYCHIATRIC: No depression, anxiety, mood swings, or difficulty sleeping  HEME/LYMPH: No easy bruising, or bleeding gums  ALLERY AND IMMUNOLOGIC: No hives or eczema	    [x ] All others negative	  [ ] Unable to obtain    PHYSICAL EXAM:  T(C): 37 (24 @ 12:53), Max: 37.2 (24 @ 17:17)  HR: 87 (24 @ 12:53) (68 - 99)  BP: 144/98 (24 @ 12:53) (121/82 - 158/96)  RR: 18 (24 @ 12:53) (18 - 18)  SpO2: 99% (24 @ 12:53) (97% - 99%)  Wt(kg): --  I&O's Summary      Appearance: Normal	  HEENT:   Normal oral mucosa, PERRL, EOMI	  Lymphatic: No lymphadenopathy  Cardiovascular: Normal S1 S2, No JVD, No murmurs, No edema  Respiratory: Lungs clear to auscultation	  Psychiatry: A & O x 3, Mood & affect appropriate  Gastrointestinal:  Soft, Non-tender, + BS	  Skin: No rashes, No ecchymoses, No cyanosis	  Neurologic: Non-focal  Extremities: Normal range of motion, No clubbing, cyanosis or edema  Vascular: Peripheral pulses palpable 2+ bilaterally    	    ECG:   RADIOLOGY:  OTHER: 	  	  LABS:	 	    CARDIAC MARKERS:                    9.9    5.34  )-----------( 288      ( 2024 07:28 )             32.4         139  |  103  |  11  ----------------------------<  76  4.3   |  22  |  0.61    Ca    8.9      2024 07:28    TPro  6.2  /  Alb  3.5  /  TBili  0.2  /  DBili  x   /  AST  21  /  ALT  38  /  AlkPhos  149<H>

## 2024-04-24 NOTE — CONSULT NOTE ADULT - ASSESSMENT
ASSESSMENT/PLAN:  In summary, Ms. Calabrese is a  32yo F with FH significant for HTN and personal history of  s/p repeat  section POD # 5, post op course complicated by severe Preeclampsia. Patient is currently on Labetalol 400 mg Q8H and overnight Hydralazine 10 mg TID was added to optimize BPs.     - Continue Labetalol 400 mg Q8H   - IV Furosemide 20 mg IVP x 2 doses and if being discharged PO lasix for 3 days QD   - Continue Hydralazine 10 mg TID   - Vitals as per unit protocol  - Maintain K > 4.0 and Mg > 1.8  - TTE to evaluate wall motion and valvular function     Call with any concerns   . Cardio- OB following

## 2024-04-25 ENCOUNTER — NON-APPOINTMENT (OUTPATIENT)
Age: 34
End: 2024-04-25

## 2024-04-25 VITALS
RESPIRATION RATE: 18 BRPM | TEMPERATURE: 98 F | OXYGEN SATURATION: 98 % | HEART RATE: 84 BPM | SYSTOLIC BLOOD PRESSURE: 123 MMHG | DIASTOLIC BLOOD PRESSURE: 88 MMHG

## 2024-04-25 PROBLEM — G43.909 MIGRAINE, UNSPECIFIED, NOT INTRACTABLE, WITHOUT STATUS MIGRAINOSUS: Chronic | Status: ACTIVE | Noted: 2024-04-12

## 2024-04-25 LAB — SURGICAL PATHOLOGY STUDY: SIGNIFICANT CHANGE UP

## 2024-04-25 PROCEDURE — 84156 ASSAY OF PROTEIN URINE: CPT

## 2024-04-25 PROCEDURE — 81003 URINALYSIS AUTO W/O SCOPE: CPT

## 2024-04-25 PROCEDURE — 86901 BLOOD TYPING SEROLOGIC RH(D): CPT

## 2024-04-25 PROCEDURE — 36415 COLL VENOUS BLD VENIPUNCTURE: CPT

## 2024-04-25 PROCEDURE — 80053 COMPREHEN METABOLIC PANEL: CPT

## 2024-04-25 PROCEDURE — 82570 ASSAY OF URINE CREATININE: CPT

## 2024-04-25 PROCEDURE — 86780 TREPONEMA PALLIDUM: CPT

## 2024-04-25 PROCEDURE — 85027 COMPLETE CBC AUTOMATED: CPT

## 2024-04-25 PROCEDURE — 85025 COMPLETE CBC W/AUTO DIFF WBC: CPT

## 2024-04-25 PROCEDURE — 59050 FETAL MONITOR W/REPORT: CPT

## 2024-04-25 PROCEDURE — 84550 ASSAY OF BLOOD/URIC ACID: CPT

## 2024-04-25 PROCEDURE — 59025 FETAL NON-STRESS TEST: CPT

## 2024-04-25 PROCEDURE — 93356 MYOCRD STRAIN IMG SPCKL TRCK: CPT

## 2024-04-25 PROCEDURE — 86850 RBC ANTIBODY SCREEN: CPT

## 2024-04-25 PROCEDURE — 83735 ASSAY OF MAGNESIUM: CPT

## 2024-04-25 PROCEDURE — 83615 LACTATE (LD) (LDH) ENZYME: CPT

## 2024-04-25 PROCEDURE — 93306 TTE W/DOPPLER COMPLETE: CPT

## 2024-04-25 PROCEDURE — 88302 TISSUE EXAM BY PATHOLOGIST: CPT

## 2024-04-25 PROCEDURE — 70450 CT HEAD/BRAIN W/O DYE: CPT | Mod: MC

## 2024-04-25 PROCEDURE — 86900 BLOOD TYPING SEROLOGIC ABO: CPT

## 2024-04-25 RX ORDER — IBUPROFEN 200 MG
3 TABLET ORAL
Qty: 0 | Refills: 0 | DISCHARGE

## 2024-04-25 RX ORDER — LABETALOL HCL 100 MG
2 TABLET ORAL
Qty: 180 | Refills: 0
Start: 2024-04-25 | End: 2024-05-24

## 2024-04-25 RX ORDER — FUROSEMIDE 40 MG
1 TABLET ORAL
Qty: 3 | Refills: 0
Start: 2024-04-25 | End: 2024-04-27

## 2024-04-25 RX ORDER — HYDRALAZINE HCL 50 MG
1 TABLET ORAL
Qty: 90 | Refills: 0
Start: 2024-04-25 | End: 2024-05-24

## 2024-04-25 RX ADMIN — HEPARIN SODIUM 5000 UNIT(S): 5000 INJECTION INTRAVENOUS; SUBCUTANEOUS at 08:44

## 2024-04-25 RX ADMIN — Medication 10 MILLIGRAM(S): at 12:40

## 2024-04-25 RX ADMIN — Medication 10 MILLIGRAM(S): at 06:30

## 2024-04-25 RX ADMIN — Medication 400 MILLIGRAM(S): at 06:30

## 2024-04-25 RX ADMIN — Medication 975 MILLIGRAM(S): at 10:14

## 2024-04-25 RX ADMIN — Medication 400 MILLIGRAM(S): at 12:40

## 2024-04-25 RX ADMIN — Medication 20 MILLIGRAM(S): at 12:40

## 2024-04-25 RX ADMIN — Medication 975 MILLIGRAM(S): at 10:58

## 2024-04-25 NOTE — PROGRESS NOTE ADULT - ATTENDING COMMENTS
I have personally seen, examined, and participated in the care of this patient. I have reviewed all pertinent clinical information, including history, physical exam, plan, and the Resident 's note and agree except as noted.                abd- soft, nontender  incision- healing well.               uterus- nontender, firm , below umbilicus               lochia- mild.               ext- no cords.  a/p  S/P C/S         bp not controlled.. Cards consult.          Increase OOB         Regular diet         PO Pain protocol         Routine Postpartum Care
Ob Attg Note:  Pt is stable and doing well today.  VSS. afebrile.  BP's within good range, on Labetalol  abd exam is normal and benign.  Agree w/ the daily progress note as entered.  no stigmata of worsening preeclampsia.  continue routine PP / postop care  will consider discharge tomorrow 4/22 if all remains well.
I have personally seen, examined, and participated in the care of this patient. I have reviewed all pertinent clinical information, including history, physical exam, plan, and the Resident 's note and agree except as noted.                abd- soft, nontender  incision- healing well.               uterus- nontender, firm , below umbilicus               lochia- mild.               ext- no cords.  a/p  S/P C/S         Increase OOB         bps around 140s systolic.         Ob cards clearance to discharge.         BPs at home. Office visit in one week.         Regular diet         PO Pain protocol         Routine Postpartum Care
Ob Attg Note:  Pt is stable and doing well today.  VSS. afebrile  abd exam is normal and benign.  Agree w/ the daily progress note as entered.
ob attg note  pt c/o headache.  no visual changes or sxs.  no n/v.  no upper abd pain.   her CS incision pain is adequately controlled.  BP's are elevated, 140-150's / 90's.  pulse rate: normal.   afebrile  advised:   - titrate labetalol till bp's reasonably controlled.  target bp range: 110-130's / 60-80's.  - headaches may be a symptom of preeclampsia vs migraines / tension HA's.  Pt has a hx of chronic headaches.  treat w/ her regular migraine meds  - order head CT imaging, r/o any intracranial processes.  - recheck CBC, CMP tomorrow 4/24/24  - continue in house observation.  pt is not cleared for discharge as of yet.

## 2024-04-25 NOTE — PROGRESS NOTE ADULT - SUBJECTIVE AND OBJECTIVE BOX
Postpartum Note,  Section, POD#3    SUBJECTIVE:   The patient feels well.  She is ambulating.   She is tolerating regular diet.  She denies nausea and vomiting.  She is voiding.  Her pain is controlled.  She reports normal postpartum bleeding    VITAL SIGNS:  Vital Signs Last 24 Hrs  T(C): 36.6 (2024 05:15), Max: 36.8 (2024 14:39)  T(F): 97.8 (2024 05:15), Max: 98.3 (2024 20:59)  HR: 71 (2024 05:15) (71 - 89)  BP: 143/92 (2024 05:15) (115/74 - 143/92)  RR: 18 (2024 05:15) (18 - 18)  SpO2: 98% (2024 05:15) (94% - 99%)    Parameters below as of 2024 05:15  Patient On (Oxygen Delivery Method): room air        PHYSICAL EXAM:   Gen: NAD  Breast: Soft, nontender, not engorged.  Abdomen: Soft, nontender, no distension , firm uterine fundus at umbilicus.  Incision: Clean, dry, and intact.  non erythematous  Pelvic: Normal lochia noted  Ext: No calf tenderness    LABS:                        9.9    5.90  )-----------( 249      ( 2024 07:14 )             31.4       24 @ 06:01      133<L>  |  102  |  5<L>  ----------------------------<  117<H>  4.1   |  21<L>  |  0.57    24 @ 11:41      135  |  103  |  7   ----------------------------<  75  3.9   |  19<L>  |  0.59        Ca    7.2<L>      2024 06:01  Ca    9.4      2024 11:41  Mg     6.0<H>     04-20  Mg     5.8<H>     04-20  Mg     5.3<H>     04-20  Mg     4.6<H>     04-19    TPro  5.5<L>  /  Alb  3.1<L>  /  TBili  0.1<L>  /  DBili  x   /  AST  15  /  ALT  9<L>  /  AlkPhos  197<H>  24 @ 06:01  TPro  6.5  /  Alb  3.5  /  TBili  0.2  /  DBili  x   /  AST  8<L>  /  ALT  11  /  AlkPhos  230<H>  24 @ 11:41    ASSESSMENT & PLAN:   s/p  section at 36-37wk due to preeclampsia.   Pt is stable and doing well currently.  There's no apparent pp or post-surgical complications.  no sign of worsening preeclampsia.   D/C planning and home instructions discussed and all q's answered.  Pt may be discharged today  home care and f/u discussed.  Pt knows how to monitor her BP's at home.  will contact me if BP's persistently > 140/90 or if symptoms of preeclampsia.  she'll continue Labetalol 200mg po bid.  eRx sent to her pharm.          
S: 32yo POD#2 s/p rLTCS+BS c/b sPEC/Mg. Her pain is well controlled. She is tolerating a regular diet and passing flatus. Denies N/V. Denies CP/SOB/lightheadedness/dizziness. She is ambulating without difficulty. Voiding spontaneously. Patient denies HA, SOB, changes or spots in vision, new swelling in the hands and face, or RUQ/epigastric pain.     O:  Vitals:  Vital Signs Last 24 Hrs  T(C): 36 (21 Apr 2024 00:00), Max: 36.9 (20 Apr 2024 04:07)  T(F): 96.8 (21 Apr 2024 00:00), Max: 98.4 (20 Apr 2024 04:07)  HR: 77 (21 Apr 2024 00:00) (68 - 85)  BP: 125/85 (21 Apr 2024 00:00) (114/72 - 144/92)  BP(mean): --  RR: 18 (21 Apr 2024 00:00) (17 - 18)  SpO2: 98% (21 Apr 2024 00:00) (96% - 99%)    Parameters below as of 21 Apr 2024 00:00  Patient On (Oxygen Delivery Method): room air        MEDICATIONS  (STANDING):  acetaminophen     Tablet .. 975 milliGRAM(s) Oral <User Schedule>  diphtheria/tetanus/pertussis (acellular) Vaccine (Adacel) 0.5 milliLiter(s) IntraMuscular once  heparin   Injectable 5000 Unit(s) SubCutaneous every 12 hours  ibuprofen  Tablet. 600 milliGRAM(s) Oral every 6 hours  labetalol 200 milliGRAM(s) Oral two times a day  lactated ringers. 1000 milliLiter(s) (125 mL/Hr) IV Continuous <Continuous>  oxytocin Infusion 16.667 milliUNIT(s)/Min (50 mL/Hr) IV Continuous <Continuous>      MEDICATIONS  (PRN):  dexAMETHasone  Injectable 4 milliGRAM(s) IV Push every 6 hours PRN Nausea  diphenhydrAMINE 25 milliGRAM(s) Oral every 6 hours PRN Pruritus  diphenhydrAMINE Injectable 25 milliGRAM(s) IV Push every 4 hours PRN Pruritus  lanolin Ointment 1 Application(s) Topical every 6 hours PRN Sore Nipples  magnesium hydroxide Suspension 30 milliLiter(s) Oral two times a day PRN Constipation  nalbuphine Injectable 2.5 milliGRAM(s) IV Push every 6 hours PRN Pruritus  naloxone Injectable 0.1 milliGRAM(s) IV Push every 3 minutes PRN For ANY of the following changes in patient status:  A. Breaths Per Minute LESS THAN 10, B. Oxygen saturation LESS THAN 90%, C. Sedation score of 6 for Stop After: 4 Times  ondansetron Injectable 4 milliGRAM(s) IV Push every 6 hours PRN Nausea  simethicone 80 milliGRAM(s) Chew every 4 hours PRN Gas      Labs:  Blood type: O Positive  Rubella IgG: RPR: Negative                          10.1<L>   10.83<H> >-----------< 211    ( 04-20 @ 06:02 )             31.4<L>                        11.3<L>   7.20 >-----------< 193    ( 04-19 @ 11:41 )             34.0<L>    04-20-24 @ 06:01      133<L>  |  102  |  5<L>  ----------------------------<  117<H>  4.1   |  21<L>  |  0.57    04-19-24 @ 11:41      135  |  103  |  7   ----------------------------<  75  3.9   |  19<L>  |  0.59        Ca    7.2<L>      20 Apr 2024 06:01  Ca    9.4      19 Apr 2024 11:41  Mg     6.0<H>     04-20  Mg     5.8<H>     04-20  Mg     5.3<H>     04-20  Mg     4.6<H>     04-19    TPro  5.5<L>  /  Alb  3.1<L>  /  TBili  0.1<L>  /  DBili  x   /  AST  15  /  ALT  9<L>  /  AlkPhos  197<H>  04-20-24 @ 06:01  TPro  6.5  /  Alb  3.5  /  TBili  0.2  /  DBili  x   /  AST  8<L>  /  ALT  11  /  AlkPhos  230<H>  04-19-24 @ 11:41          PE:  General: NAD  CV: RRR  Pulm: CTAB  Abdomen: Soft, appropriately tender, Fundus firm incision c/d/i.  VE: No heavy vaginal bleeding  Extremities: No erythema, no pitting edema    
Day 1 of Anesthesia Pain Management Service    SUBJECTIVE:  Pain Scale Score:          [X] Refer to charted pain scores    THERAPY:    s/p 100 mcg PF morphine on 4/19      MEDICATIONS  (STANDING):  acetaminophen     Tablet .. 975 milliGRAM(s) Oral <User Schedule>  diphtheria/tetanus/pertussis (acellular) Vaccine (Adacel) 0.5 milliLiter(s) IntraMuscular once  heparin   Injectable 5000 Unit(s) SubCutaneous every 12 hours  ibuprofen  Tablet. 600 milliGRAM(s) Oral every 6 hours  ketorolac   Injectable 30 milliGRAM(s) IV Push every 6 hours  labetalol 200 milliGRAM(s) Oral two times a day  lactated ringers. 1000 milliLiter(s) (125 mL/Hr) IV Continuous <Continuous>  magnesium sulfate Infusion 2 Gm/Hr (50 mL/Hr) IV Continuous <Continuous>  morphine PF Spinal 0.1 milliGRAM(s) IntraThecal. once  oxytocin Infusion 16.667 milliUNIT(s)/Min (50 mL/Hr) IV Continuous <Continuous>    MEDICATIONS  (PRN):  butorphanol Injectable 0.25 milliGRAM(s) IV Push every 6 hours PRN Pruritus  dexAMETHasone  Injectable 4 milliGRAM(s) IV Push every 6 hours PRN Nausea  diphenhydrAMINE 25 milliGRAM(s) Oral every 6 hours PRN Pruritus  diphenhydrAMINE Injectable 25 milliGRAM(s) IV Push every 4 hours PRN Pruritus  lanolin Ointment 1 Application(s) Topical every 6 hours PRN Sore Nipples  magnesium hydroxide Suspension 30 milliLiter(s) Oral two times a day PRN Constipation  nalbuphine Injectable 2.5 milliGRAM(s) IV Push every 6 hours PRN Pruritus  naloxone Injectable 0.1 milliGRAM(s) IV Push every 3 minutes PRN For ANY of the following changes in patient status:  A. Breaths Per Minute LESS THAN 10, B. Oxygen saturation LESS THAN 90%, C. Sedation score of 6 for Stop After: 4 Times  ondansetron Injectable 4 milliGRAM(s) IV Push every 6 hours PRN Nausea  oxyCODONE    IR 10 milliGRAM(s) Oral every 3 hours PRN Moderate Pain (4 - 6)  oxyCODONE    IR 5 milliGRAM(s) Oral every 3 hours PRN Mild Pain (1 - 3)  simethicone 80 milliGRAM(s) Chew every 4 hours PRN Gas      OBJECTIVE:    Sedation:        	[X] Alert	[ ] Drowsy	[ ] Arousable      [ ] Asleep       [ ] Unresponsive    Side Effects:	[X] None	[ ] Nausea	[ ] Vomiting         [ ] Pruritus  		[ ] Weakness            [ ] Numbness	          [ ] Other:    Vital Signs Last 24 Hrs  T(C): 36.4 (20 Apr 2024 08:27), Max: 36.9 (19 Apr 2024 12:11)  T(F): 97.5 (20 Apr 2024 08:27), Max: 98.4 (19 Apr 2024 12:11)  HR: 80 (20 Apr 2024 11:07) (60 - 101)  BP: 114/72 (20 Apr 2024 11:07) (103/57 - 148/93)  BP(mean): 105 (19 Apr 2024 21:25) (94 - 107)  RR: 18 (20 Apr 2024 11:07) (16 - 18)  SpO2: 98% (20 Apr 2024 11:07) (96% - 99%)    Parameters below as of 20 Apr 2024 11:07  Patient On (Oxygen Delivery Method): room air        ASSESSMENT/ PLAN  [X] Patient transitioned to prn analgesics  [X] Pain management per primary service, pain service to sign off   [X]Documentation and Verification of current medications
S: 32yo POD#6 s/p rLTCS+BS c/b sPEC/Mg. Her pain is well controlled. She is tolerating a regular diet and passing flatus. Denies N/V. Denies CP/SOB/lightheadedness/dizziness. She is ambulating without difficulty. Voiding spontaneously. Patient denies HA, SOB, changes or spots in vision, new swelling in the hands and face, or RUQ/epigastric pain.     O:  Vital Signs Last 24 Hrs  T(C): 36.6 (25 Apr 2024 00:13), Max: 37 (24 Apr 2024 12:53)  T(F): 97.9 (25 Apr 2024 00:13), Max: 98.6 (24 Apr 2024 12:53)  HR: 84 (25 Apr 2024 00:13) (74 - 96)  BP: 139/90 (25 Apr 2024 00:13) (119/77 - 144/98)  RR: 18 (25 Apr 2024 00:13) (18 - 18)  SpO2: 99% (25 Apr 2024 00:13) (98% - 99%)    Parameters below as of 25 Apr 2024 00:13  Patient On (Oxygen Delivery Method): room air        MEDICATIONS  (STANDING):  acetaminophen     Tablet .. 975 milliGRAM(s) Oral <User Schedule>  diphenhydrAMINE 25 milliGRAM(s) Oral once  diphtheria/tetanus/pertussis (acellular) Vaccine (Adacel) 0.5 milliLiter(s) IntraMuscular once  furosemide   Injectable 20 milliGRAM(s) IV Push every 24 hours  heparin   Injectable 5000 Unit(s) SubCutaneous every 12 hours  hydrALAZINE 10 milliGRAM(s) Oral three times a day  labetalol 400 milliGRAM(s) Oral every 8 hours  lactated ringers. 1000 milliLiter(s) (125 mL/Hr) IV Continuous <Continuous>  oxytocin Infusion 16.667 milliUNIT(s)/Min (50 mL/Hr) IV Continuous <Continuous>    MEDICATIONS  (PRN):  dexAMETHasone  Injectable 4 milliGRAM(s) IV Push every 6 hours PRN Nausea  diphenhydrAMINE 25 milliGRAM(s) Oral every 6 hours PRN Pruritus  diphenhydrAMINE Injectable 25 milliGRAM(s) IV Push every 4 hours PRN Pruritus  lanolin Ointment 1 Application(s) Topical every 6 hours PRN Sore Nipples  magnesium hydroxide Suspension 30 milliLiter(s) Oral two times a day PRN Constipation  nalbuphine Injectable 2.5 milliGRAM(s) IV Push every 6 hours PRN Pruritus  naloxone Injectable 0.1 milliGRAM(s) IV Push every 3 minutes PRN For ANY of the following changes in patient status:  A. Breaths Per Minute LESS THAN 10, B. Oxygen saturation LESS THAN 90%, C. Sedation score of 6 for Stop After: 4 Times  ondansetron Injectable 4 milliGRAM(s) IV Push every 6 hours PRN Nausea  simethicone 80 milliGRAM(s) Chew every 4 hours PRN Gas  SUMAtriptan 50 milliGRAM(s) Oral every 12 hours PRN Headache      Labs:  Blood type: O Positive  Rubella IgG: RPR: Negative                          9.9<L>   5.34 >-----------< 288    ( 04-24 @ 07:28 )             32.4<L>                        9.9<L>   5.90 >-----------< 249    ( 04-22 @ 07:14 )             31.4<L>    04-24-24 @ 07:28      139  |  103  |  11  ----------------------------<  76  4.3   |  22  |  0.61    04-22-24 @ 07:18      140  |  107  |  8   ----------------------------<  71  4.3   |  21<L>  |  0.58        Ca    8.9      24 Apr 2024 07:28  Ca    8.7      22 Apr 2024 07:18    TPro  6.2  /  Alb  3.5  /  TBili  0.2  /  DBili  x   /  AST  21  /  ALT  38  /  AlkPhos  149<H>  04-24-24 @ 07:28  TPro  6.1  /  Alb  3.2<L>  /  TBili  0.1<L>  /  DBili  x   /  AST  16  /  ALT  11  /  AlkPhos  160<H>  04-22-24 @ 07:18                PE:  General: NAD  CV: RRR  Pulm: CTAB  Abdomen: Soft, appropriately tender, Fundus firm incision c/d/i.  VE: No heavy vaginal bleeding  Extremities: No erythema, no pitting edema                  
S: 34yo POD#4 s/p rLTCS+BS c/b sPEC/Mg. Her pain is well controlled. She is tolerating a regular diet and passing flatus. Denies N/V. Denies CP/SOB/lightheadedness/dizziness. She is ambulating without difficulty. Voiding spontaneously. Patient denies HA, SOB, changes or spots in vision, new swelling in the hands and face, or RUQ/epigastric pain.     O:  Vitals:  Vital Signs Last 24 Hrs  T(C): 36.5 (22 Apr 2024 23:46), Max: 37 (22 Apr 2024 09:00)  T(F): 97.7 (22 Apr 2024 23:46), Max: 98.6 (22 Apr 2024 09:00)  HR: 78 (22 Apr 2024 23:46) (66 - 79)  BP: 145/90 (22 Apr 2024 23:46) (137/89 - 154/92)  RR: 18 (22 Apr 2024 23:46) (18 - 18)  SpO2: 98% (22 Apr 2024 23:46) (97% - 98%)    Parameters below as of 22 Apr 2024 23:46  Patient On (Oxygen Delivery Method): room air      MEDICATIONS  (STANDING):  acetaminophen     Tablet .. 975 milliGRAM(s) Oral <User Schedule>  diphtheria/tetanus/pertussis (acellular) Vaccine (Adacel) 0.5 milliLiter(s) IntraMuscular once  heparin   Injectable 5000 Unit(s) SubCutaneous every 12 hours  ibuprofen  Tablet. 600 milliGRAM(s) Oral every 6 hours  labetalol 300 milliGRAM(s) Oral three times a day  lactated ringers. 1000 milliLiter(s) (125 mL/Hr) IV Continuous <Continuous>  oxytocin Infusion 16.667 milliUNIT(s)/Min (50 mL/Hr) IV Continuous <Continuous>    MEDICATIONS  (PRN):  dexAMETHasone  Injectable 4 milliGRAM(s) IV Push every 6 hours PRN Nausea  diphenhydrAMINE 25 milliGRAM(s) Oral every 6 hours PRN Pruritus  diphenhydrAMINE Injectable 25 milliGRAM(s) IV Push every 4 hours PRN Pruritus  lanolin Ointment 1 Application(s) Topical every 6 hours PRN Sore Nipples  magnesium hydroxide Suspension 30 milliLiter(s) Oral two times a day PRN Constipation  nalbuphine Injectable 2.5 milliGRAM(s) IV Push every 6 hours PRN Pruritus  naloxone Injectable 0.1 milliGRAM(s) IV Push every 3 minutes PRN For ANY of the following changes in patient status:  A. Breaths Per Minute LESS THAN 10, B. Oxygen saturation LESS THAN 90%, C. Sedation score of 6 for Stop After: 4 Times  ondansetron Injectable 4 milliGRAM(s) IV Push every 6 hours PRN Nausea  simethicone 80 milliGRAM(s) Chew every 4 hours PRN Gas    Labs:  Blood type: O Positive  Rubella IgG: RPR: Negative                          9.9<L>   5.90 >-----------< 249    ( 04-22 @ 07:14 )             31.4<L>                        10.1<L>   10.83<H> >-----------< 211    ( 04-20 @ 06:02 )             31.4<L>    04-22-24 @ 07:18      140  |  107  |  8   ----------------------------<  71  4.3   |  21<L>  |  0.58    04-20-24 @ 06:01      133<L>  |  102  |  5<L>  ----------------------------<  117<H>  4.1   |  21<L>  |  0.57        Ca    8.7      22 Apr 2024 07:18  Ca    7.2<L>      20 Apr 2024 06:01  Mg     6.0<H>     04-20  Mg     5.8<H>     04-20    TPro  6.1  /  Alb  3.2<L>  /  TBili  0.1<L>  /  DBili  x   /  AST  16  /  ALT  11  /  AlkPhos  160<H>  04-22-24 @ 07:18  TPro  5.5<L>  /  Alb  3.1<L>  /  TBili  0.1<L>  /  DBili  x   /  AST  15  /  ALT  9<L>  /  AlkPhos  197<H>  04-20-24 @ 06:01          PE:  General: NAD  CV: RRR  Pulm: CTAB  Abdomen: Soft, appropriately tender, Fundus firm incision c/d/i.  VE: No heavy vaginal bleeding  Extremities: No erythema, no pitting edema          
S: 34yo POD#5 s/p rLTCS+BS c/b sPEC/Mg. Her pain is well controlled. She is tolerating a regular diet and passing flatus. Denies N/V. Denies CP/SOB/lightheadedness/dizziness. She is ambulating without difficulty. Voiding spontaneously. Patient denies HA, SOB, changes or spots in vision, new swelling in the hands and face, or RUQ/epigastric pain.     O:  Vitals:  Vital Signs Last 24 Hrs  T(C): 36.4 (23 Apr 2024 23:33), Max: 37.2 (23 Apr 2024 17:17)  T(F): 97.6 (23 Apr 2024 23:33), Max: 99 (23 Apr 2024 17:17)  HR: 73 (24 Apr 2024 00:01) (56 - 99)  BP: 148/79 (24 Apr 2024 00:01) (121/82 - 158/96)  RR: 18 (23 Apr 2024 23:33) (18 - 18)  SpO2: 98% (23 Apr 2024 23:33) (97% - 99%)    Parameters below as of 23 Apr 2024 23:33  Patient On (Oxygen Delivery Method): room air      MEDICATIONS  (STANDING):  acetaminophen     Tablet .. 975 milliGRAM(s) Oral <User Schedule>  diphenhydrAMINE 25 milliGRAM(s) Oral once  diphtheria/tetanus/pertussis (acellular) Vaccine (Adacel) 0.5 milliLiter(s) IntraMuscular once  heparin   Injectable 5000 Unit(s) SubCutaneous every 12 hours  hydrALAZINE 10 milliGRAM(s) Oral three times a day  ibuprofen  Tablet. 600 milliGRAM(s) Oral every 6 hours  labetalol 400 milliGRAM(s) Oral every 8 hours  lactated ringers. 1000 milliLiter(s) (125 mL/Hr) IV Continuous <Continuous>  metoclopramide 10 milliGRAM(s) Oral once  oxytocin Infusion 16.667 milliUNIT(s)/Min (50 mL/Hr) IV Continuous <Continuous>    MEDICATIONS  (PRN):  dexAMETHasone  Injectable 4 milliGRAM(s) IV Push every 6 hours PRN Nausea  diphenhydrAMINE 25 milliGRAM(s) Oral every 6 hours PRN Pruritus  diphenhydrAMINE Injectable 25 milliGRAM(s) IV Push every 4 hours PRN Pruritus  lanolin Ointment 1 Application(s) Topical every 6 hours PRN Sore Nipples  magnesium hydroxide Suspension 30 milliLiter(s) Oral two times a day PRN Constipation  nalbuphine Injectable 2.5 milliGRAM(s) IV Push every 6 hours PRN Pruritus  naloxone Injectable 0.1 milliGRAM(s) IV Push every 3 minutes PRN For ANY of the following changes in patient status:  A. Breaths Per Minute LESS THAN 10, B. Oxygen saturation LESS THAN 90%, C. Sedation score of 6 for Stop After: 4 Times  ondansetron Injectable 4 milliGRAM(s) IV Push every 6 hours PRN Nausea  simethicone 80 milliGRAM(s) Chew every 4 hours PRN Gas  SUMAtriptan 50 milliGRAM(s) Oral every 12 hours PRN Headache    Labs:  Blood type: O Positive  Rubella IgG: RPR: Negative                          9.9<L>   5.90 >-----------< 249    ( 04-22 @ 07:14 )             31.4<L>    04-22-24 @ 07:18      140  |  107  |  8   ----------------------------<  71  4.3   |  21<L>  |  0.58        Ca    8.7      22 Apr 2024 07:18    TPro  6.1  /  Alb  3.2<L>  /  TBili  0.1<L>  /  DBili  x   /  AST  16  /  ALT  11  /  AlkPhos  160<H>  04-22-24 @ 07:18            PE:  General: NAD  CV: RRR  Pulm: CTAB  Abdomen: Soft, appropriately tender, Fundus firm incision c/d/i.  VE: No heavy vaginal bleeding  Extremities: No erythema, no pitting edema                
OB Progress Note:  Delivery, POD#1    S: 32yo POD#1 s/p rLTCS+BS c/b sPEC/Mg. Her pain is well controlled. She is tolerating a regular diet and passing flatus. Denies N/V. Denies CP/SOB/lightheadedness/dizziness. She is ambulating without difficulty. Voiding spontaneously. Patient denies HA, SOB, changes or spots in vision, new swelling in the hands and face, or RUQ/epigastric pain.     O:   Vital Signs Last 24 Hrs  T(C): 36.6 (2024 05:51), Max: 36.9 (2024 12:11)  T(F): 97.9 (2024 05:51), Max: 98.4 (2024 12:11)  HR: 76 (2024 05:51) (60 - 101)  BP: 144/92 (2024 05:51) (103/57 - 163/100)  BP(mean): 105 (2024 21:25) (94 - 107)  RR: 18 (2024 05:51) (16 - 18)  SpO2: 97% (2024 05:51) (96% - 99%)    Parameters below as of 2024 05:51  Patient On (Oxygen Delivery Method): room air        Labs:  Blood type: O Positive  Rubella IgG: RPR: Negative                          10.1<L>   10.83<H> >-----------< 211    (  @ 06:02 )             31.4<L>                        11.3<L>   7.20 >-----------< 193    (  @ 11:41 )             34.0<L>    24 @ 06:01      133<L>  |  102  |  5<L>  ----------------------------<  117<H>  4.1   |  21<L>  |  0.57    24 @ 11:41      135  |  103  |  7   ----------------------------<  75  3.9   |  19<L>  |  0.59        Ca    7.2<L>      2024 06:01  Ca    9.4      2024 11:41  Mg     5.8<H>       Mg     5.3<H>       Mg     4.6<H>         TPro  5.5<L>  /  Alb  3.1<L>  /  TBili  0.1<L>  /  DBili  x   /  AST  15  /  ALT  9<L>  /  AlkPhos  197<H>  24 @ 06:01  TPro  6.5  /  Alb  3.5  /  TBili  0.2  /  DBili  x   /  AST  8<L>  /  ALT  11  /  AlkPhos  230<H>  24 @ 11:41          PE:  General: NAD  Abdomen: Mildly distended, appropriately tender, incision c/d/i. Uterine fundus firm and palpated at midline  Extremities: No erythema, no pitting edema, no calf tenderness bilaterally  
Pt is well.  has no c/o.  headaches have resolved.    BP's 120-140's / 80-90's    case discussed w/ Carolann Teran NP from cardio-ob team.  She agrees pt can be discharged home today.  will continue Labetalol and Hydralazine PO.  Lasix PO to be continued for the next 3 days only.      Pt was counseled and all q's answered.  she knows how to monitor her own BP's at home.  will call me or return to the hospital if any concerns.  Otherwise, outpt f/u w/ me and also w/ cardio-ob team next week.

## 2024-04-25 NOTE — PROGRESS NOTE ADULT - ASSESSMENT
A/P: 32yo POD#6 s/p rLTCS+BS c/b sPEC/Mg.  Patient is stable and doing well post-operatively.  BPs well controlled overnight.     #sPEC  - s/p Mg for seizure ppx  - c/w Labetalol 400 TID and Hydral 10 TID for BP control  - Lasix 20 IVP x2 per Cardio OB  - HELLP labs wnl; Lab Holiday today  - Monitor BPs, BPs elevated overnight Hydral 10mg TID added to regimen overnight. Will follow BPs for potential discharge later today.  - HA resolved with Tylenol overnight, denies other severe features. CT Head unremarkable.   - TTE: EF 60%    #Maternal wellbeing  - Regular diet  - HSQ/SCDs for DVT ppx  - PNV  - Increase ambulation.  - Continue motrin, tylenol, oxycodone PRN for pain control.      Gracie Carrillo MD, PGY-3

## 2024-04-26 RX ORDER — AZITHROMYCIN 250 MG/1
250 TABLET, FILM COATED ORAL
Qty: 1 | Refills: 0 | Status: DISCONTINUED | COMMUNITY
Start: 2023-03-23 | End: 2024-04-26

## 2024-04-26 RX ORDER — RIZATRIPTAN BENZOATE 10 MG/1
10 TABLET ORAL
Qty: 6 | Refills: 1 | Status: DISCONTINUED | COMMUNITY
Start: 2023-03-28 | End: 2024-04-26

## 2024-04-30 ENCOUNTER — APPOINTMENT (OUTPATIENT)
Dept: CARDIOLOGY | Facility: CLINIC | Age: 34
End: 2024-04-30
Payer: COMMERCIAL

## 2024-04-30 DIAGNOSIS — Z82.49 FAMILY HISTORY OF ISCHEMIC HEART DISEASE AND OTHER DISEASES OF THE CIRCULATORY SYSTEM: ICD-10-CM

## 2024-04-30 PROCEDURE — 99205 OFFICE O/P NEW HI 60 MIN: CPT

## 2024-04-30 RX ORDER — MULTIVIT-MIN/IRON/FOLIC ACID/K 18-600-40
400 CAPSULE ORAL
Refills: 0 | Status: ACTIVE | COMMUNITY

## 2024-04-30 RX ORDER — HYDRALAZINE HYDROCHLORIDE 10 MG/1
10 TABLET ORAL EVERY 8 HOURS
Refills: 0 | Status: DISCONTINUED | COMMUNITY
Start: 2024-04-26 | End: 2024-04-30

## 2024-04-30 RX ORDER — PRENATAL VIT 49/IRON FUM/FOLIC 6.75-0.2MG
TABLET ORAL
Refills: 0 | Status: ACTIVE | COMMUNITY

## 2024-04-30 NOTE — DISCUSSION/SUMMARY
[FreeTextEntry1] : In summary, Ms. JULIEN BOJORQUEZ 33 year - old F  s/p repeat  section  24 post op course complicated by severe Preeclampsia presents in to establish care in the [facility] program. of note, both parents with history of HTN.   SPEC: BP Stable Encouraged Patient to monitor BP at home, keep a log, and report results back to us for evaluation. Based on the results, we will adjust medications as necessary. Additionally, encouraged a heart-healthy diet and exercise as tolerated. D/c Hydralazine  Continue Labetalol 400 Q8H ( Advised to hold if BP < 110/70 ) , Take 200 mg Q8H if - 120/ 70-80)  Call with any concerns or questions Encouraged patient to continue healthy exercise and eating habits, focusing on a Mediterranean style of eating F/u 4 weeks and Call with any concerns or questions

## 2024-04-30 NOTE — HISTORY OF PRESENT ILLNESS
[FreeTextEntry1] : Patient 10 days PP.  Ms. JULIEN BOJORQUEZ 33 year - old F is here 2024 to establish care in the [facility]. Ms. Bojorquez is a 34yo  s/p repeat  section  24 post op course complicated by severe Preeclampsia. Patient received Magnesium sulphate gtt and remains stable at the present time. Denies chest pain, shortness of breath, dizziness, lightheadedness, palpitations or near syncope or syncope, orthopnea, PND and increasing lower extremity edema. Patient was discharged home on Labetalol 400 mg Q8H and and Hydralazine 10 mg TID. SInce discharge patient discontinued Hydralazine since her BPs were trending low.   BP Log : 106- 152/ 64- 98 BP today: 127/70  SPEC: BP Stable Encouraged Patient to monitor BP at home, keep a log, and report results back to us for evaluation. Based on the results, we will adjust medications as necessary. Additionally, encouraged a heart-healthy diet and exercise as tolerated. D/c Hydralazine  Continue Labetalol 400 Q8H ( Advised to hold if BP < 110/70 ) , Take 200 mg Q8H if - 120/ 70-80)  Call with any concerns or questions Encouraged patient to continue healthy exercise and eating habits, focusing on a Mediterranean style of eating F/u 4 weeks and Call with any concerns or questions

## 2024-04-30 NOTE — CARDIOLOGY SUMMARY
[de-identified] : CONCLUSIONS:    1. Left ventricular cavity is normal in size. Left ventricular wall thickness is normal. Left ventricular systolic function is normal with an ejection fraction of 60 % by Felipe's method of disks. There are no regional wall motion abnormalities seen.  2. Left ventricular global longitudinal strain is -25.0 % which is normal (< -18%). Images were acquired on a Rich ultrasound system and processed on the ultrasound machine with a heart rate of 89 bpm and a blood pressure of 144/98 mmHg.  3. Normal left ventricular diastolic function, with normal filling pressure.  4. Normal right ventricular cavity size, with normal wall thickness, and normal systolic function.  5. No prior echocardiogram is available for comparison.  ________________________________________________________________________________________

## 2024-05-01 ENCOUNTER — NON-APPOINTMENT (OUTPATIENT)
Age: 34
End: 2024-05-01

## 2024-05-13 ENCOUNTER — NON-APPOINTMENT (OUTPATIENT)
Age: 34
End: 2024-05-13

## 2024-05-30 ENCOUNTER — APPOINTMENT (OUTPATIENT)
Dept: CARDIOLOGY | Facility: CLINIC | Age: 34
End: 2024-05-30
Payer: COMMERCIAL

## 2024-05-30 PROCEDURE — 99213 OFFICE O/P EST LOW 20 MIN: CPT

## 2024-05-30 RX ORDER — LABETALOL HYDROCHLORIDE 100 MG/1
100 TABLET, FILM COATED ORAL
Qty: 120 | Refills: 0 | Status: ACTIVE | COMMUNITY
Start: 2024-05-30 | End: 1900-01-01

## 2024-05-30 RX ORDER — LABETALOL HYDROCHLORIDE 200 MG/1
200 TABLET, FILM COATED ORAL
Qty: 60 | Refills: 0 | Status: DISCONTINUED | COMMUNITY
Start: 2024-04-26 | End: 2024-05-30

## 2024-06-02 NOTE — HISTORY OF PRESENT ILLNESS
[Home] : at home, [unfilled] , at the time of the visit. [Other Location: e.g. Home (Enter Location, City,State)___] : at [unfilled] [Verbal consent obtained from patient] : the patient, [unfilled] [FreeTextEntry1] : Patient 10 days PP.  Ms. JULIEN BOJORQUEZ 33 year - old F is here 2024 to establish care in the [facility]. Ms. Bojorquez is a 34yo  s/p repeat  section  24 post op course complicated by severe Preeclampsia. Patient received Magnesium sulphate gtt and remains stable at the present time. Denies chest pain, shortness of breath, dizziness, lightheadedness, palpitations or near syncope or syncope, orthopnea, PND and increasing lower extremity edema. Patient was discharged home on Labetalol 400 mg Q8H and and Hydralazine 10 mg TID. SInce discharge patient discontinued Hydralazine since her BPs were trending low.   BP Log : 106- 152/ 64- 98 BP today: 127/70  SPEC: BP Stable Encouraged Patient to monitor BP at home, keep a log, and report results back to us for evaluation. Based on the results, we will adjust medications as necessary. Additionally, encouraged a heart-healthy diet and exercise as tolerated. D/c Hydralazine  Continue Labetalol 400 Q8H ( Advised to hold if BP < 110/70 ) , Take 200 mg Q8H if - 120/ 70-80)  Call with any concerns or questions Encouraged patient to continue healthy exercise and eating habits, focusing on a Mediterranean style of eating F/u 4 weeks and Call with any concerns or questions

## 2024-06-02 NOTE — CARDIOLOGY SUMMARY
[de-identified] : CONCLUSIONS:    1. Left ventricular cavity is normal in size. Left ventricular wall thickness is normal. Left ventricular systolic function is normal with an ejection fraction of 60 % by Felipe's method of disks. There are no regional wall motion abnormalities seen.  2. Left ventricular global longitudinal strain is -25.0 % which is normal (< -18%). Images were acquired on a Rich ultrasound system and processed on the ultrasound machine with a heart rate of 89 bpm and a blood pressure of 144/98 mmHg.  3. Normal left ventricular diastolic function, with normal filling pressure.  4. Normal right ventricular cavity size, with normal wall thickness, and normal systolic function.  5. No prior echocardiogram is available for comparison.  ________________________________________________________________________________________

## 2024-06-02 NOTE — DISCUSSION/SUMMARY
[FreeTextEntry1] : In summary, Ms. JULIEN BOJORQUEZ 33 year - old F  s/p repeat  section  24 post op course complicated by severe Preeclampsia presents in to establish care in the [facility] program. of note, both parents with history of HTN.   SPEC: BP Stable Encouraged Patient to monitor BP at home, keep a log, and report results back to us for evaluation. Based on the results, we will adjust medications as necessary. Additionally, encouraged a heart-healthy diet and exercise as tolerated. D/c Hydralazine  Continue Labetalol 400 Q8H ( Advised to hold if BP < 110/70 ) , Take 200 mg Q8H if - 120/ 70-80)  Call with any concerns or questions Encouraged patient to continue healthy exercise and eating habits, focusing on a Mediterranean style of eating F/u 4 weeks and Call with any concerns or questions

## 2024-06-02 NOTE — CARDIOLOGY SUMMARY
[de-identified] : CONCLUSIONS:    1. Left ventricular cavity is normal in size. Left ventricular wall thickness is normal. Left ventricular systolic function is normal with an ejection fraction of 60 % by Felipe's method of disks. There are no regional wall motion abnormalities seen.  2. Left ventricular global longitudinal strain is -25.0 % which is normal (< -18%). Images were acquired on a Rich ultrasound system and processed on the ultrasound machine with a heart rate of 89 bpm and a blood pressure of 144/98 mmHg.  3. Normal left ventricular diastolic function, with normal filling pressure.  4. Normal right ventricular cavity size, with normal wall thickness, and normal systolic function.  5. No prior echocardiogram is available for comparison.  ________________________________________________________________________________________

## 2024-06-02 NOTE — DISCUSSION/SUMMARY
[FreeTextEntry1] : In summary, Ms. JULIEN BOOJRQUEZ 33 year - old F  s/p repeat  section  24 post op course complicated by severe Preeclampsia presents in to establish care in the [facility] program. of note, both parents with history of HTN.   SPEC: BP Stable Encouraged Patient to monitor BP at home, keep a log, and report results back to us for evaluation. Based on the results, we will adjust medications as necessary. Additionally, encouraged a heart-healthy diet and exercise as tolerated. D/c Hydralazine  Continue Labetalol 400 Q8H ( Advised to hold if BP < 110/70 ) , Take 200 mg Q8H if - 120/ 70-80)  Call with any concerns or questions Encouraged patient to continue healthy exercise and eating habits, focusing on a Mediterranean style of eating F/u 4 weeks and Call with any concerns or questions

## 2024-06-04 ENCOUNTER — NON-APPOINTMENT (OUTPATIENT)
Age: 34
End: 2024-06-04

## 2024-07-11 ENCOUNTER — APPOINTMENT (OUTPATIENT)
Dept: CARDIOLOGY | Facility: CLINIC | Age: 34
End: 2024-07-11
Payer: COMMERCIAL

## 2024-07-11 DIAGNOSIS — O14.10 SEVERE PRE-ECLAMPSIA, UNSPECIFIED TRIMESTER: ICD-10-CM

## 2024-07-11 PROCEDURE — 99214 OFFICE O/P EST MOD 30 MIN: CPT

## 2024-12-23 ENCOUNTER — NON-APPOINTMENT (OUTPATIENT)
Age: 34
End: 2024-12-23

## 2025-02-08 ENCOUNTER — NON-APPOINTMENT (OUTPATIENT)
Age: 35
End: 2025-02-08

## 2025-03-16 ENCOUNTER — NON-APPOINTMENT (OUTPATIENT)
Age: 35
End: 2025-03-16